# Patient Record
Sex: MALE | Race: BLACK OR AFRICAN AMERICAN
[De-identification: names, ages, dates, MRNs, and addresses within clinical notes are randomized per-mention and may not be internally consistent; named-entity substitution may affect disease eponyms.]

---

## 2020-08-29 ENCOUNTER — HOSPITAL ENCOUNTER (INPATIENT)
Dept: HOSPITAL 72 - EMR | Age: 45
LOS: 2 days | Discharge: HOME | DRG: 199 | End: 2020-08-31
Payer: COMMERCIAL

## 2020-08-29 VITALS — DIASTOLIC BLOOD PRESSURE: 100 MMHG | SYSTOLIC BLOOD PRESSURE: 162 MMHG

## 2020-08-29 VITALS — DIASTOLIC BLOOD PRESSURE: 116 MMHG | SYSTOLIC BLOOD PRESSURE: 182 MMHG

## 2020-08-29 VITALS — SYSTOLIC BLOOD PRESSURE: 207 MMHG | DIASTOLIC BLOOD PRESSURE: 113 MMHG

## 2020-08-29 VITALS — DIASTOLIC BLOOD PRESSURE: 130 MMHG | SYSTOLIC BLOOD PRESSURE: 193 MMHG

## 2020-08-29 VITALS — BODY MASS INDEX: 30.16 KG/M2 | HEIGHT: 74 IN | WEIGHT: 235 LBS

## 2020-08-29 VITALS — DIASTOLIC BLOOD PRESSURE: 102 MMHG | SYSTOLIC BLOOD PRESSURE: 192 MMHG

## 2020-08-29 VITALS — SYSTOLIC BLOOD PRESSURE: 175 MMHG | DIASTOLIC BLOOD PRESSURE: 110 MMHG

## 2020-08-29 VITALS — DIASTOLIC BLOOD PRESSURE: 120 MMHG | SYSTOLIC BLOOD PRESSURE: 185 MMHG

## 2020-08-29 VITALS — DIASTOLIC BLOOD PRESSURE: 117 MMHG | SYSTOLIC BLOOD PRESSURE: 195 MMHG

## 2020-08-29 VITALS — SYSTOLIC BLOOD PRESSURE: 201 MMHG | DIASTOLIC BLOOD PRESSURE: 115 MMHG

## 2020-08-29 DIAGNOSIS — E11.65: ICD-10-CM

## 2020-08-29 DIAGNOSIS — Z91.018: ICD-10-CM

## 2020-08-29 DIAGNOSIS — Z86.711: ICD-10-CM

## 2020-08-29 DIAGNOSIS — Z88.8: ICD-10-CM

## 2020-08-29 DIAGNOSIS — M51.26: ICD-10-CM

## 2020-08-29 DIAGNOSIS — Z79.4: ICD-10-CM

## 2020-08-29 DIAGNOSIS — Z91.041: ICD-10-CM

## 2020-08-29 DIAGNOSIS — I42.9: ICD-10-CM

## 2020-08-29 DIAGNOSIS — F19.10: ICD-10-CM

## 2020-08-29 DIAGNOSIS — G89.4: ICD-10-CM

## 2020-08-29 DIAGNOSIS — F43.10: ICD-10-CM

## 2020-08-29 DIAGNOSIS — F15.10: ICD-10-CM

## 2020-08-29 DIAGNOSIS — Z91.14: ICD-10-CM

## 2020-08-29 DIAGNOSIS — R19.7: ICD-10-CM

## 2020-08-29 DIAGNOSIS — D50.9: ICD-10-CM

## 2020-08-29 DIAGNOSIS — Z88.6: ICD-10-CM

## 2020-08-29 DIAGNOSIS — Z20.828: ICD-10-CM

## 2020-08-29 DIAGNOSIS — F41.8: ICD-10-CM

## 2020-08-29 DIAGNOSIS — M47.26: ICD-10-CM

## 2020-08-29 DIAGNOSIS — Z88.0: ICD-10-CM

## 2020-08-29 DIAGNOSIS — I16.0: Primary | ICD-10-CM

## 2020-08-29 DIAGNOSIS — R10.12: ICD-10-CM

## 2020-08-29 DIAGNOSIS — M48.061: ICD-10-CM

## 2020-08-29 DIAGNOSIS — E87.6: ICD-10-CM

## 2020-08-29 LAB
ADD MANUAL DIFF: NO
ALBUMIN SERPL-MCNC: 3.9 G/DL (ref 3.4–5)
ALBUMIN/GLOB SERPL: 0.9 {RATIO} (ref 1–2.7)
ALP SERPL-CCNC: 87 U/L (ref 46–116)
ALT SERPL-CCNC: 20 U/L (ref 12–78)
ANION GAP SERPL CALC-SCNC: 8 MMOL/L (ref 5–15)
APPEARANCE UR: CLEAR
APTT BLD: 24 SEC (ref 23–33)
APTT PPP: (no result) S
AST SERPL-CCNC: 17 U/L (ref 15–37)
BASOPHILS NFR BLD AUTO: 0.9 % (ref 0–2)
BILIRUB SERPL-MCNC: 0.3 MG/DL (ref 0.2–1)
BUN SERPL-MCNC: 13 MG/DL (ref 7–18)
CALCIUM SERPL-MCNC: 9.3 MG/DL (ref 8.5–10.1)
CHLORIDE SERPL-SCNC: 100 MMOL/L (ref 98–107)
CO2 SERPL-SCNC: 30 MMOL/L (ref 21–32)
CREAT SERPL-MCNC: 1.1 MG/DL (ref 0.55–1.3)
EOSINOPHIL NFR BLD AUTO: 2.4 % (ref 0–3)
ERYTHROCYTE [DISTWIDTH] IN BLOOD BY AUTOMATED COUNT: 15.9 % (ref 11.6–14.8)
FERRITIN SERPL-MCNC: 14 NG/ML (ref 8–388)
GLOBULIN SER-MCNC: 4.2 G/DL
GLUCOSE UR STRIP-MCNC: (no result) MG/DL
HCT VFR BLD CALC: 39.1 % (ref 42–52)
HGB BLD-MCNC: 11.9 G/DL (ref 14.2–18)
INR PPP: 0.9 (ref 0.9–1.1)
KETONES UR QL STRIP: NEGATIVE
LEUKOCYTE ESTERASE UR QL STRIP: NEGATIVE
LYMPHOCYTES NFR BLD AUTO: 17.9 % (ref 20–45)
MCV RBC AUTO: 86 FL (ref 80–99)
MONOCYTES NFR BLD AUTO: 8.7 % (ref 1–10)
NEUTROPHILS NFR BLD AUTO: 70.1 % (ref 45–75)
NITRITE UR QL STRIP: NEGATIVE
PH UR STRIP: 7 [PH] (ref 4.5–8)
PLATELET # BLD: 327 K/UL (ref 150–450)
POTASSIUM SERPL-SCNC: 3 MMOL/L (ref 3.5–5.1)
PROT UR QL STRIP: (no result)
RBC # BLD AUTO: 4.55 M/UL (ref 4.7–6.1)
SODIUM SERPL-SCNC: 138 MMOL/L (ref 136–145)
SP GR UR STRIP: 1.01 (ref 1–1.03)
UROBILINOGEN UR-MCNC: NORMAL MG/DL (ref 0–1)
WBC # BLD AUTO: 5.4 K/UL (ref 4.8–10.8)

## 2020-08-29 PROCEDURE — 87045 FECES CULTURE AEROBIC BACT: CPT

## 2020-08-29 PROCEDURE — 84550 ASSAY OF BLOOD/URIC ACID: CPT

## 2020-08-29 PROCEDURE — 86140 C-REACTIVE PROTEIN: CPT

## 2020-08-29 PROCEDURE — 80048 BASIC METABOLIC PNL TOTAL CA: CPT

## 2020-08-29 PROCEDURE — 85007 BL SMEAR W/DIFF WBC COUNT: CPT

## 2020-08-29 PROCEDURE — 96361 HYDRATE IV INFUSION ADD-ON: CPT

## 2020-08-29 PROCEDURE — 85730 THROMBOPLASTIN TIME PARTIAL: CPT

## 2020-08-29 PROCEDURE — 81003 URINALYSIS AUTO W/O SCOPE: CPT

## 2020-08-29 PROCEDURE — 74176 CT ABD & PELVIS W/O CONTRAST: CPT

## 2020-08-29 PROCEDURE — 83550 IRON BINDING TEST: CPT

## 2020-08-29 PROCEDURE — 36415 COLL VENOUS BLD VENIPUNCTURE: CPT

## 2020-08-29 PROCEDURE — 84100 ASSAY OF PHOSPHORUS: CPT

## 2020-08-29 PROCEDURE — 96375 TX/PRO/DX INJ NEW DRUG ADDON: CPT

## 2020-08-29 PROCEDURE — 85379 FIBRIN DEGRADATION QUANT: CPT

## 2020-08-29 PROCEDURE — 82746 ASSAY OF FOLIC ACID SERUM: CPT

## 2020-08-29 PROCEDURE — 71250 CT THORAX DX C-: CPT

## 2020-08-29 PROCEDURE — 82607 VITAMIN B-12: CPT

## 2020-08-29 PROCEDURE — 80076 HEPATIC FUNCTION PANEL: CPT

## 2020-08-29 PROCEDURE — 96374 THER/PROPH/DIAG INJ IV PUSH: CPT

## 2020-08-29 PROCEDURE — 84484 ASSAY OF TROPONIN QUANT: CPT

## 2020-08-29 PROCEDURE — 85025 COMPLETE CBC W/AUTO DIFF WBC: CPT

## 2020-08-29 PROCEDURE — 71045 X-RAY EXAM CHEST 1 VIEW: CPT

## 2020-08-29 PROCEDURE — 82962 GLUCOSE BLOOD TEST: CPT

## 2020-08-29 PROCEDURE — 99285 EMERGENCY DEPT VISIT HI MDM: CPT

## 2020-08-29 PROCEDURE — 85610 PROTHROMBIN TIME: CPT

## 2020-08-29 PROCEDURE — 93005 ELECTROCARDIOGRAM TRACING: CPT

## 2020-08-29 PROCEDURE — 96376 TX/PRO/DX INJ SAME DRUG ADON: CPT

## 2020-08-29 PROCEDURE — 83605 ASSAY OF LACTIC ACID: CPT

## 2020-08-29 PROCEDURE — 80307 DRUG TEST PRSMV CHEM ANLYZR: CPT

## 2020-08-29 PROCEDURE — 82728 ASSAY OF FERRITIN: CPT

## 2020-08-29 PROCEDURE — 93306 TTE W/DOPPLER COMPLETE: CPT

## 2020-08-29 PROCEDURE — 83735 ASSAY OF MAGNESIUM: CPT

## 2020-08-29 PROCEDURE — 80053 COMPREHEN METABOLIC PANEL: CPT

## 2020-08-29 PROCEDURE — 83540 ASSAY OF IRON: CPT

## 2020-08-29 RX ADMIN — INSULIN ASPART SCH UNITS: 100 INJECTION, SOLUTION INTRAVENOUS; SUBCUTANEOUS at 21:39

## 2020-08-29 RX ADMIN — INSULIN ASPART SCH UNITS: 100 INJECTION, SOLUTION INTRAVENOUS; SUBCUTANEOUS at 17:24

## 2020-08-29 RX ADMIN — LORAZEPAM PRN MG: 0.5 TABLET ORAL at 23:22

## 2020-08-29 RX ADMIN — CARVEDILOL SCH MG: 25 TABLET, FILM COATED ORAL at 21:38

## 2020-08-29 RX ADMIN — OXYCODONE HYDROCHLORIDE PRN MG: 15 TABLET ORAL at 23:22

## 2020-08-29 RX ADMIN — CLONIDINE HYDROCHLORIDE SCH MG: 0.2 TABLET ORAL at 17:27

## 2020-08-29 RX ADMIN — CLONIDINE HYDROCHLORIDE SCH MG: 0.2 TABLET ORAL at 13:20

## 2020-08-29 NOTE — EMERGENCY ROOM REPORT
History of Present Illness


General


Chief Complaint:  Abdominal Pain


Source:  Patient





Present Illness


HPI


The patient was signed out to me by Dr. Saldana.


The patient mainly is complaining about left upper abdominal pain that began 

yesterday.  He also has chest pain that is exertional radiating to his 

shoulder.  The abdominal pain radiates downward to his legs also.  Denies any 

diarrhea.  The patient received 1 dose of fentanyl.  Initially it helped but 

the pain is returned and he states is worse than when he came in.  He rates it 

at 6/10 and throbbing.  In addition he is complaining about a right-sided 

throbbing headache that somewhat less than that.  His blood pressures gone up 

in the emergency department he is worried about that also.  The patient denies 

dysuria or diarrhea.  The pain is throbbing and constant.


The patient states that he has had 2 pulmonary emboli in the last year.  He 

stopped anticoagulation in January or February.  He is requesting that we 

restart anticoagulation at this time.


Patient also states that he has a reduced ejection fraction.  We have a nuclear 

med scan scan from 2016 that showed 53%.  In discussing this the patient states 

that is much less than that at this time.


Patient is a diabetic.


Patient is allergic to contrast dye.  He states that he usually gets prednisone 

and Solu-Medrol before having his CT studies.





Patient is d-dimer is negative.  Lovenox not indicated or other anticoagulation 

at this time.  Suspicion for pulmonary embolus extremely low as oxygen 

saturation is normal.


Allergies:  


Coded Allergies:  


     HYDRALAZINE (Verified  Allergy, Severe, angio edema, 4/28/18)


 angio edema


     ETODOLAC (Unverified  Allergy, Mild, 4/28/18)


     AMLODIPINE (Unverified  Allergy, Unknown, 4/28/18)


 tolerates nifedipine 8/3/16


     ASPIRIN (Verified  Allergy, Unknown, 4/28/18)


     ATENOLOL (Verified  Allergy, Unknown, 4/28/18)


 tolerates metoprolol 8/3/16


     DIPHENHYDRAMINE (Verified  Allergy, Unknown, 4/28/18)


     HYDROCHLOROTHIAZIDE (Verified  Allergy, Unknown, 4/28/18)


     LABETALOL (Verified  Allergy, Unknown, 4/28/18)


 tolerates metoprolol 8/3/16


     LISINOPRIL (Unverified  Allergy, Unknown, 4/26/16)


     LORATADINE (Verified  Allergy, Unknown, 4/28/18)


     MILK (Verified  Allergy, Unknown, 4/28/18)


     MORPHINE (Verified  Allergy, Unknown, 4/28/18)


     NICARDIPINE (Verified  Allergy, Unknown, 4/28/18)


 tolerates nifedipine 8/3/16


     NITROGLYCERIN (Unverified  Allergy, Unknown, 4/28/18)


     PENICILLINS (Unverified  Allergy, Unknown, 4/28/18)


 tolerates cephalexin 8/3/16


     [place] Nut (Verified  Allergy, Unknown, 4/28/18)


     SHELLFISH DERIVED (Verified  Allergy, Unknown, 4/28/18)





COVID-19 Screening


Contact w/high risk pt:  No


Experienced COVID-19 symptoms?:  No


COVID-19 Testing performed PTA:  No





Patient History


Past Medical History:  see triage record


Social History:  Denies: smoking - recently stopped, alcohol use, drug use


Social History Narrative


Law Student


Reviewed Nursing Documentation:  PMH: Agreed; PSxH: Agreed





Nursing Documentation-PMH


Past Medical History:  No History, Except For


Hx Cardiac Problems:  Yes - loop recorder placed in 2019


Hx Hypertension:  Yes


Hx Diabetes:  Yes


Hx Cancer:  No


Hx Gastrointestinal Problems:  Yes - peptic ulcer


Hx Dialysis:  No - spinal stenosis


Hx Neurological Problems:  Yes - spinal stenosis





Review of Systems


All Other Systems:  negative except mentioned in HPI





Physical Exam





Vital Signs








  Date Time  Temp Pulse Resp B/P (MAP) Pulse Ox O2 Delivery O2 Flow Rate FiO2


 


8/29/20 04:22 99.0 80 16 138/90 (106) 98 Room Air  








Sp02 EP Interpretation:  reviewed, normal


General Appearance:  well appearing, no apparent distress, GCS 15, non-toxic


Head:  normocephalic


Eyes:  bilateral eye normal inspection, bilateral eye PERRL, bilateral eye EOMI


ENT:  moist mucus membranes


Neck:  supple


Respiratory:  normal inspection


Cardiovascular #1:  regular rate, rhythm, no edema


Cardiovascular #2:  2+ radial (R)


Gastrointestinal:  normal inspection, normal bowel sounds, no mass, non-

distended, no guarding, no rebound, tenderness - Left upper quadrant, overweight


Genitourinary:  no CVA tenderness


Musculoskeletal:  back normal, normal range of motion, no calf tenderness, gait/

station normal


Neurologic:  alert, oriented x3, grossly normal


Psychiatric:  anxious


Skin:  no rash, warm/dry





Procedures


Critical Care Time


Critical Care Time


Total Critical Care Time: 45 min bedside evaluation and treatment excludes 

procedures (EKG).


Reason for critical care: review of records, hypertensive urgency, discussion 

with HMO physician, admitting MD, GI specialist


Possible complications: hypotension, hypertension, MI, shock, arrhythmias, 

metabolic acidosis, end organ damage.


Interventions: analgesia, metoprolol, repeat evaluations, 


Course: Patient signed out to me with abdominal pain, chest pain and 

hypertension.  Expanded evaluation undertaken.  Treatment of pain.  Continued 

hypertension.  Multiple discussions with HMO MD.  Metoprolol given IV.  CT 

reviewed.  Repeat exam and analgesia.  HTN continued and metoprolol repeated 

IV.  HMO physician states unstable for transfer.  Discussion with admitting MD 

and GI consultant.


Consultations: nursing staff, HMO physician, admitting physician, GI specialist

, signing out ERMD


Performed by: Dr. Rey


Tolerated well condition = serious - deemed non-transferrable by O MD due to 

hypertensive urgency





Medical Decision Making


Diagnostic Impression:  


 Primary Impression:  


 Hypertensive urgency


 Additional Impressions:  


 Hypertension


 Qualified Codes:  I10 - Essential (primary) hypertension


 Chest pain


 Qualified Codes:  R07.9 - Chest pain, unspecified


 Hypokalemia


 Abdominal pain


 Qualified Codes:  R10.12 - Left upper quadrant pain


 COVID-19 ruled out by laboratory testing


ER Course


Patient is complex presenting with left-sided chest pain and left abdominal 

pain of a days duration.  Differential includes acute myocardial infarction, 

acute coronary syndrome, gastritis, diverticulitis among viral syndrome, 

pancreatitis amongst others.  Work-up has been initiated.  Patient is 

complaining about increased pain and also his blood pressure is increasing and 

out-of-control at this time.  Further labs and CT of the chest and abdomen are 

ordered.  We are unable to use contrast because of his history of allergy at 

this time.  Consideration of anticoagulation if d-dimer is positive.  The 

patient will be treated for pain at this time.  As the patient's oxygen 

saturation is 100% on room air embolus is less likely at this time.  Most 

likely this patient will need to be admitted for observation.





EKG reviewed.  Sinus tachycardia with ST inversions inferiorly no acute injury.





Brief discussion with Dr. Mccain who states he does not want to take the 

patient.  He feels the patient is only drug seeking.  If we need to transfer, 

then he wants me to tell the patient that he will not receive any pain medicine.





COVID-19 test negative.





Patient states his pain is up to an 8 after receiving Dilaudid 1 mg.





BP still elevated after treating pain.  Metoprolol ordered.





Dr. Garza states to admit here as BP not controlled.





Repeat metoprolol.





Discussed with admitting MD as well as GI specialist who evaluated the patient 

in the ED.





BP still high but improved.





Laboratory Tests








Test


  8/29/20


05:00 8/29/20


05:05 8/29/20


05:20 8/29/20


06:45


 


Magnesium Level


  1.8 MG/DL


(1.8-2.4) 


  


  


 


 


Ferritin


  14 NG/ML


(8-388) 


  


  


 


 


Troponin I


  0.000 ng/mL


(0.000-0.056) 


  


  


 


 


C-Reactive Protein,


Quantitative 0.7 mg/dL


(0.00-0.90) 


  


  


 


 


White Blood Count


  


  5.4 K/UL


(4.8-10.8) 


  


 


 


Red Blood Count


  


  4.55 M/UL


(4.70-6.10)  L 


  


 


 


Hemoglobin


  


  11.9 G/DL


(14.2-18.0)  L 


  


 


 


Hematocrit


  


  39.1 %


(42.0-52.0)  L 


  


 


 


Mean Corpuscular Volume  86 FL (80-99)    


 


Mean Corpuscular Hemoglobin


  


  26.2 PG


(27.0-31.0)  L 


  


 


 


Mean Corpuscular Hemoglobin


Concent 


  30.5 G/DL


(32.0-36.0)  L 


  


 


 


Red Cell Distribution Width


  


  15.9 %


(11.6-14.8)  H 


  


 


 


Platelet Count


  


  327 K/UL


(150-450) 


  


 


 


Mean Platelet Volume


  


  7.1 FL


(6.5-10.1) 


  


 


 


Neutrophils (%) (Auto)


  


  70.1 %


(45.0-75.0) 


  


 


 


Lymphocytes (%) (Auto)


  


  17.9 %


(20.0-45.0)  L 


  


 


 


Monocytes (%) (Auto)


  


  8.7 %


(1.0-10.0) 


  


 


 


Eosinophils (%) (Auto)


  


  2.4 %


(0.0-3.0) 


  


 


 


Basophils (%) (Auto)


  


  0.9 %


(0.0-2.0) 


  


 


 


Sodium Level


  


  138 MMOL/L


(136-145) 


  


 


 


Potassium Level


  


  3.0 MMOL/L


(3.5-5.1)  L 


  


 


 


Chloride Level


  


  100 MMOL/L


() 


  


 


 


Carbon Dioxide Level


  


  30 MMOL/L


(21-32) 


  


 


 


Anion Gap


  


  8 mmol/L


(5-15) 


  


 


 


Blood Urea Nitrogen


  


  13 mg/dL


(7-18) 


  


 


 


Creatinine


  


  1.1 MG/DL


(0.55-1.30) 


  


 


 


Estimated Glomerular


Filtration Rate 


  > 60 mL/min


(>60) 


  


 


 


Glucose Level


  


  290 MG/DL


()  H 


  


 


 


Lactic Acid Level


  


  1.00 mmol/L


(0.4-2.0) 


  


 


 


Calcium Level


  


  9.3 MG/DL


(8.5-10.1) 


  


 


 


Total Bilirubin


  


  0.3 MG/DL


(0.2-1.0) 


  


 


 


Aspartate Amino Transferase


(AST) 


  17 U/L (15-37)


  


  


 


 


Alanine Aminotransferase (ALT)


  


  20 U/L (12-78)


  


  


 


 


Alkaline Phosphatase


  


  87 U/L


() 


  


 


 


Total Protein


  


  8.1 G/DL


(6.4-8.2) 


  


 


 


Albumin


  


  3.9 G/DL


(3.4-5.0) 


  


 


 


Globulin  4.2 g/dL    


 


Albumin/Globulin Ratio


  


  0.9 (1.0-2.7)


L 


  


 


 


Prothrombin Time


  


  


  10.4 SEC


(9.30-11.50) 


 


 


Prothrombin Time INR   0.9 (0.9-1.1)   


 


Activated Partial


Thromboplast Time 


  


  24 SEC (23-33)


  


 


 


D-Dimer


  


  


  0.38 mg/L FEU


(0.00-0.49) 


 


 


Urine Color    Pale yellow  


 


Urine Appearance    Clear  


 


Urine pH    7 (4.5-8.0)  


 


Urine Specific Gravity


  


  


  


  1.010


(1.005-1.035)


 


Urine Protein


  


  


  


  3+ (NEGATIVE)


H


 


Urine Glucose (UA)


  


  


  


  4+ (NEGATIVE)


H


 


Urine Ketones


  


  


  


  Negative


(NEGATIVE)


 


Urine Blood


  


  


  


  1+ (NEGATIVE)


H


 


Urine Nitrite


  


  


  


  Negative


(NEGATIVE)


 


Urine Bilirubin


  


  


  


  Negative


(NEGATIVE)


 


Urine Urobilinogen


  


  


  


  Normal MG/DL


(0.0-1.0)


 


Urine Leukocyte Esterase


  


  


  


  Negative


(NEGATIVE)


 


Urine RBC


  


  


  


  0-2 /HPF (0 -


0)  H


 


Urine WBC


  


  


  


  0-2 /HPF (0 -


0)


 


Urine Squamous Epithelial


Cells 


  


  


  Occasional


/LPF


 


Urine Bacteria


  


  


  


  Occasional


/HPF (NONE)


 


Urine Opiates Screen


  


  


  


  Negative


(NEGATIVE)


 


Urine Barbiturates Screen


  


  


  


  Negative


(NEGATIVE)


 


Phencyclidine (PCP) Screen


  


  


  


  Negative


(NEGATIVE)


 


Urine Amphetamines Screen


  


  


  


  Negative


(NEGATIVE)


 


Urine Benzodiazepines Screen


  


  


  


  Positive


(NEGATIVE)  H


 


Urine Cocaine Screen


  


  


  


  Negative


(NEGATIVE)


 


Urine Marijuana (THC) Screen


  


  


  


  Negative


(NEGATIVE)








Microbiology








 Date/Time


Source Procedure


Growth Status


 


 


 8/29/20 07:35


Nasopharynx SARS-CoV-2 RdRp Gene Assay - Final Complete








EKG Diagnostic Results


Rate:  tachycardiac


Rhythm:  NSR


ST Segments:  no acute changes - ST inversions inferiorly





Rhythm Strip Diag. Results


EP Interpretation:  yes


Rhythm:  no PVC's, no ectopy, other - Sinus tachycardia 102





Chest X-Ray Diagnostic Results


Chest X-Ray Diagnostic Results :  


   Chest X-Ray Ordered:  Yes


   # of Views/Limited/Complete:  1 View


   Indication:  Chest Pain


   EP Interpretation:  Yes


   Interpretation:  no consolidation, no effusion, no pneumothorax, other - 

globular heart


   Impression:  Other


   Electronically Signed by:  Electronically signed by Rosalino Rey MD





CT/MRI/US Diagnostic Results


CT/MRI/US Diagnostic Results :  


   Imaging Test Ordered:  chest/abd/pelvis


   Impression


 


IMPRESSION:     


  Normal abdomen and pelvis CT.


 IMPRESSION:     


  No acute findings in the chest.





Last Vital Signs








  Date Time  Temp Pulse Resp B/P (MAP) Pulse Ox O2 Delivery O2 Flow Rate FiO2


 


8/29/20 17:27    175/110    


 


8/29/20 17:26  75      


 


8/29/20 16:00 97.7  20  100   


 


8/29/20 11:08      Room Air  








Status:  improved


Disposition:  PLACE IN OBSERVATION


Condition:  Serious


Referrals:  


[facility]











[facility]











[facility]


Patient Instructions:  Abdominal Pain, Adult











Rosalino Rey MD Aug 29, 2020 06:49

## 2020-08-29 NOTE — NUR
Nurse Note:



Pt brought in by ambulance 26 c/o LT side upper and lower abd pain since 8/28. 
10/10 pain, Pt stated unk cause, denies trauma, denies abdnormal ingestion. Pt 
denies ETOH use, drug use. Pt stated he is healthy. Pt attached to [facility]. All 
safety measures met; will continue to [facility].

## 2020-08-29 NOTE — NUR
ED Nurse Note:



Recieved erport from pm nurse to resume care, pt ambulating from bathroom, 
states had large loose stool, recently medicated for pain, meds effective with 
pain level decreased to 5/10, pt replaced on monitoring, has patent saline lock 
in left hand, will continue to closely monitor while waiting for imaging.

## 2020-08-29 NOTE — DIAGNOSTIC IMAGING REPORT
EXAM:

  XR Chest, 1 View

 

CLINICAL HISTORY:

  VOMITING

 

TECHNIQUE:

  Frontal view of the chest.

 

COMPARISON:

  No relevant prior studies available.

 

FINDINGS:

  Lungs:  Unremarkable.  No consolidation.

  Pleural space:  Unremarkable.  No pneumothorax.

  Heart:  Unremarkable.  No cardiomegaly.

  Mediastinum:  Unremarkable.

  Bones/joints:  Unremarkable.

 

IMPRESSION:     

  Normal chest x-ray.

## 2020-08-29 NOTE — EMERGENCY ROOM REPORT
History of Present Illness


General


Chief Complaint:  Abdominal Pain


Source:  Patient





Present Illness


HPI


45-year-old male with one episode of vomiting and diarrhea earlier tonight and 

generalized abdominal pain.  Patient says that he was eating dinner and then 

several hours later began to feel generalized abdominal cramping and pain and 

vomited once and had one episode of diarrhea.  Vomiting was nonbilious nonbloody

, only occurred 1 time.  He had one episode of diarrhea nonbloody.  Pain is 

sharp in nature, located diffusely, poorly localized.  He is in no distress 

whatsoever in the emergency department.  Denies fevers, chills, chest pain, 

palpitation, shortness of breath, back pain, dysuria, hematuria.  Patient 

claims that he has CHF with an ejection fraction of 16% and 2 pulmonary 

embolisms in the past but not on any anticoagulation because "they said I did 

not need it anymore."


On arrival the patient was immediately demanding Dilaudid and became very 

agitated when I told him that there was no indication for this.


Allergies:  


Coded Allergies:  


     HYDRALAZINE (Verified  Allergy, Severe, angio edema, 4/28/18)


 angio edema


     ETODOLAC (Unverified  Allergy, Mild, 4/28/18)


     AMLODIPINE (Unverified  Allergy, Unknown, 4/28/18)


 tolerates nifedipine 8/3/16


     ASPIRIN (Verified  Allergy, Unknown, 4/28/18)


     ATENOLOL (Verified  Allergy, Unknown, 4/28/18)


 tolerates metoprolol 8/3/16


     DIPHENHYDRAMINE (Verified  Allergy, Unknown, 4/28/18)


     HYDROCHLOROTHIAZIDE (Verified  Allergy, Unknown, 4/28/18)


     LABETALOL (Verified  Allergy, Unknown, 4/28/18)


 tolerates metoprolol 8/3/16


     LISINOPRIL (Unverified  Allergy, Unknown, 4/26/16)


     LORATADINE (Verified  Allergy, Unknown, 4/28/18)


     MILK (Verified  Allergy, Unknown, 4/28/18)


     MORPHINE (Verified  Allergy, Unknown, 4/28/18)


     NICARDIPINE (Verified  Allergy, Unknown, 4/28/18)


 tolerates nifedipine 8/3/16


     NITROGLYCERIN (Unverified  Allergy, Unknown, 4/28/18)


     PENICILLINS (Unverified  Allergy, Unknown, 4/28/18)


 tolerates cephalexin 8/3/16


     [place] Nut (Verified  Allergy, Unknown, 4/28/18)


     SHELLFISH DERIVED (Verified  Allergy, Unknown, 4/28/18)





COVID-19 Screening


Contact w/high risk pt:  No


Experienced COVID-19 symptoms?:  No


COVID-19 Testing performed PTA:  No





Nursing Documentation-[facility]


Past Medical History:  No History, Except For


Hx Cardiac Problems:  Yes - loop recorder placed in 2019


Hx Hypertension:  Yes


Hx Diabetes:  Yes


Hx Cancer:  No


Hx Gastrointestinal Problems:  Yes - peptic ulcer


Hx Dialysis:  No - spinal stenosis


Hx Neurological Problems:  Yes - spinal stenosis





Review of Systems


All Other Systems:  negative except mentioned in HPI





Physical Exam





Vital Signs








  Date Time  Temp Pulse Resp B/P (MAP) Pulse Ox O2 Delivery O2 Flow Rate FiO2


 


8/29/20 04:22 99.0 80 16 138/90 (106) 98 Room Air  








Sp02 EP Interpretation:  reviewed, normal


General Appearance:  no apparent distress, alert, GCS 15, non-toxic


Head:  normocephalic, atraumatic


Eyes:  bilateral eye normal inspection, bilateral eye PERRL


ENT:  hearing grossly normal, normal pharynx, no angioedema, normal voice


Neck:  full range of motion, supple/symm/no masses


Respiratory:  chest non-tender, lungs clear, normal breath sounds, speaking 

full sentences


Cardiovascular #1:  regular rate, rhythm, no edema


Cardiovascular #2:  2+ carotid (R), 2+ carotid (L), 2+ radial (R), 2+ radial (L)

, 2+ dorsalis pedis (R), 2+ dorsalis pedis (L)


Gastrointestinal:  normal bowel sounds, soft, non-distended, no guarding, no 

rebound, other - Subjective generalized abdominal tenderness.  Nondistended, non

-peritoneal ache.  No focal pain.  No rebound or guarding


Rectal:  deferred


Genitourinary:  normal inspection, no CVA tenderness


Musculoskeletal:  back normal, normal range of motion, calf tenderness, gait/

station normal, non-tender


Neurologic:  alert, motor strength/tone normal, oriented x3, sensory intact, 

responsive, speech normal


Psychiatric:  judgement/insight normal, memory normal, mood/affect normal, no 

suicidal/homicidal ideation


Lymphatic:  no adenopathy





Medical Decision Making


Diagnostic Impression:  


 Primary Impression:  


 Vomiting and diarrhea


ER Course


EKG: Rate 104 bpm.  QTc 497.  No obvious ST or T wave abnormalities.  Otherwise 

normal intervals.  Sinus tachycardia.  Normal axis





Chest x-ray: No infiltrate/effusion. Mediastinum within normal limits





Laboratory Tests








Test


  8/29/20


05:05


 


White Blood Count


  5.4 K/UL


(4.8-10.8)


 


Red Blood Count


  4.55 M/UL


(4.70-6.10)  L


 


Hemoglobin


  11.9 G/DL


(14.2-18.0)  L


 


Hematocrit


  39.1 %


(42.0-52.0)  L


 


Mean Corpuscular Volume 86 FL (80-99)  


 


Mean Corpuscular Hemoglobin


  26.2 PG


(27.0-31.0)  L


 


Mean Corpuscular Hemoglobin


Concent 30.5 G/DL


(32.0-36.0)  L


 


Red Cell Distribution Width


  15.9 %


(11.6-14.8)  H


 


Platelet Count


  327 K/UL


(150-450)


 


Mean Platelet Volume


  7.1 FL


(6.5-10.1)


 


Neutrophils (%) (Auto)


  70.1 %


(45.0-75.0)


 


Lymphocytes (%) (Auto)


  17.9 %


(20.0-45.0)  L


 


Monocytes (%) (Auto)


  8.7 %


(1.0-10.0)


 


Eosinophils (%) (Auto)


  2.4 %


(0.0-3.0)


 


Basophils (%) (Auto)


  0.9 %


(0.0-2.0)


 


Sodium Level


  138 MMOL/L


(136-145)


 


Potassium Level


  3.0 MMOL/L


(3.5-5.1)  L


 


Chloride Level


  100 MMOL/L


()


 


Carbon Dioxide Level


  30 MMOL/L


(21-32)


 


Anion Gap


  8 mmol/L


(5-15)


 


Blood Urea Nitrogen


  13 mg/dL


(7-18)


 


Creatinine


  1.1 MG/DL


(0.55-1.30)


 


Estimated Glomerular


Filtration Rate > 60 mL/min


(>60)


 


Glucose Level


  290 MG/DL


()  H


 


Lactic Acid Level


  1.00 mmol/L


(0.4-2.0)


 


Calcium Level


  9.3 MG/DL


(8.5-10.1)


 


Total Bilirubin


  0.3 MG/DL


(0.2-1.0)


 


Aspartate Amino Transferase


(AST) 17 U/L (15-37)


 


 


Alanine Aminotransferase (ALT)


  20 U/L (12-78)


 


 


Alkaline Phosphatase


  87 U/L


()


 


Total Protein


  8.1 G/DL


(6.4-8.2)


 


Albumin


  3.9 G/DL


(3.4-5.0)


 


Globulin 4.2 g/dL  


 


Albumin/Globulin Ratio


  0.9 (1.0-2.7)


L





45-year-old male here with vomiting and diarrhea.  Patient was hemodynamically 

stable and in no acute distress whatsoever.  Despite this the patient was 

requesting Dilaudid multiple times in the emergency department.  Review of the 

patient's [employer] activity showed many recent prescriptions for opiate 

medications.  I reviewed this with the patient and he immediately became very 

agitated and then started demanding fentanyl.  He received 1 dose of fentanyl 

in the emergency department and was tolerating p.o. intake and he appeared to 

be in no acute distress.  CBC unremarkable.  CMP revealed a mildly low 

potassium of 3.0 which was repleted with 40 mg p.o. that the patient tolerated 

well.  He received Zofran.  When informed the patient that his labs were 

otherwise unremarkable he became extremely agitated and became verbally abusive 

and angry.  He otherwise was in no acute distress whatsoever and had completely 

normal vital signs in the emergency department.  Discharged in stable condition.





Last Vital Signs








  Date Time  Temp Pulse Resp B/P (MAP) Pulse Ox O2 Delivery O2 Flow Rate FiO2


 


8/29/20 04:25  102 16   Room Air  


 


8/29/20 04:25 98.7   162/100 99   








Referrals:  


NON PHYSICIAN (PCP)











Win Saldana M.D. Aug 29, 2020 05:41

## 2020-08-29 NOTE — HISTORY AND PHYSICAL REPORT
DATE OF ADMISSION:  08/29/2020

TIME SEEN:  At 9 a.m.



CONSULTANTS:

1. Phill Fleming MD.

2. Behnoush Zarrini, MD.

3. Kirsten Aguilar MD.

4. Rishabh Sweet MD.



CHIEF COMPLAINT:  Abdominal pain.



BRIEF HISTORY:  The patient is a 45-year-old male, who lives at home,

presents with increased abdominal pain for nine days with nausea,

vomiting, and diarrhea as well, came into [facility], diagnosed with the

above, and hypokalemia and was being admitted to medical floor.

Currently, slightly anxious in bed, oriented x3, in slight distress

secondary to abdominal pain.



PAST MEDICAL HISTORY:  Includes hypertension, diabetes, anxiety, PTSD.



PAST SURGICAL HISTORY:  Hernia x2, pneumothorax, back surgery.



MEDICATIONS:  Include Dilaudid, Lopressor, K-Dur, Zofran.



ALLERGIES:  Sulfa, amlodipine, aspirin, atenolol, diphenhydramine, _____,

hydralazine, hydrochlorothiazide, labetalol, lisinopril, loratadine and

morphine.



SOCIAL HISTORY:  The patient states no smoking, no alcohol, no intravenous

drug abuse.



FAMILY HISTORY:  Noncontributory.



PHYSICAL EXAMINATION:

GENERAL:  Slightly anxious in ER [facility], oriented x3, in slight distress

secondary to abdominal pain.

VITAL SIGNS:  Temperature is 98, pulse 89, respirations 13, blood pressure

_____.

CARDIOVASCULAR:  No murmur.

LUNGS:  Distant and clear.

ABDOMEN:  Bowel sounds positive.  Soft.  Slightly tender.  No guarding.  No

rigidity.  No rebound.

EXTREMITIES:  Show no cyanosis, clubbing, or edema.

NEUROLOGIC:  The patient moves all extremities, slightly weak.



LABORATORY AND DIAGNOSTIC DATA:  Labs at this time show hemoglobin and

hematocrit 11/39, otherwise CBC is normal.  BMP shows potassium 3.0,

glucose 290, otherwise normal.  INR is 0.9.  PTT 24.  Urinalysis show 1+

blood, 3+ protein, 4+ glucose.  Urine tox positive for benzos.



ASSESSMENT:

1. Abdominal pain.

2. Hypertensive urgency.

3. Anemia.

4. Hypokalemia.

5. Diabetes.

6. Hyperglycemia.

7. PTSD.

8. Anxiety.



PLAN:

1. NPO.

2. IV fluids.

3. Blood pressure, blood sugar, and pain control.

4. Resume home medications.

5. We will add Cardiology and Nephrology evaluation.

6. We will continue to follow this patient medically.

7. PT and dietary evaluation.

8. CBC and BMP in the morning.









  ______________________________________________

  Madi Torres D.O.





DR:  JHONNY

D:  08/29/2020 09:15

T:  08/30/2020 00:10

JOB#:  [id number]

CC:

## 2020-08-29 NOTE — NUR
Nurse Note:



Per Dr. Cabrales, pt is being admitted for further observation. Called lab for 
add ons for blood test. Urine sent to lab, awaiting result. Report given SARAH Kauffman for contintuiy of care. Pt ambulated to restroom with steady gait. Pt stated 
he might have an episode of diarrhea. Pending CT; radiology tech aware.

## 2020-08-29 NOTE — NUR
NURSE HAND-OFF REPORT: 



Important Events on Shift:Pain Management

Patient Status: Stable

Diet: Cardiac



Pending Orders: NA

Pending Results/Labs:OB stool

Pending MD notification:NA



Latest Vital Signs: Temperature 97.7 , Pulse 75 , B/P 175 /110 , Respiratory Rate 20 , O2 
 , Room Air, O2 Flow Rate .  

Vital Sign Comment: Stable



EKG Rhythm: Sinus Rhythm

Rhythm change?: N 

MD Notified?: -

MD Response: 



Latest Fong Fall Score: 20  

Fall Risk: Low Risk 

Safety Measures: Call light Within Reach, Bed Alarm Zone 1, Side Rails Side Rails x2, Bed 
position Low and Locked.

Fall Precautions: 

Patient Fall Education



Report given to Sharon/SARAH.

## 2020-08-29 NOTE — NUR
NURSE NOTES:

Patient is awake, alert and oriented x4, complaining of back and left upper abdominal pain . 
IV on Right hand 22g, intact. Encouraged to use call light when needed. Contacting primary 
MD and consults for medication especially pain medication orders. Called nurse supervisor as 
day nurse stated she already tried to call and could not reach Dr Fabian. will attempt 
again

## 2020-08-29 NOTE — CONSULTATION
DATE OF CONSULTATION:  08/29/2020

GASTROENTEROLOGY CONSULTATION



CONSULTING PHYSICIAN:  Angi Allen MD



REFERRING PHYSICIAN:  Madi Torres DO



CHIEF COMPLAINT:  I was asked to see this patient by Dr. Madi Torres for

evaluation of abdominal pain.



HISTORY OF PRESENT ILLNESS:  The patient is a 45-year-old 

man with multiple medical problems including diabetes, who comes in to the

hospital due to 1-day history of abdominal pain.  He says the pain is more

on the left side, perhaps more on the left upper side.  He had 3 episodes

of vomiting yesterday and diarrhea.  He has been on omeprazole for

long-term for acid control.  He has not had endoscopy or colonoscopy

before.  He is on long-term pain medications for his back pain.



PAST MEDICAL HISTORY:  History of diabetes, hypertension, anxiety, and

degenerative joint disease with spinal stenosis.



MEDICATIONS:  Insulin, nitroglycerin, atenolol, morphine, lisinopril,

Valium, clonidine, carvedilol, and Dilaudid.



FAMILY HISTORY:  Noncontributory.



SOCIAL HISTORY:  The patient is single.  He does not smoke or drink alcohol

at this time.



REVIEW OF SYSTEMS:  Otherwise negative.



PHYSICAL EXAMINATION:

GENERAL:  A well-developed, well-nourished  man seen in his

room in the emergency room.

HEENT:  Normocephalic and atraumatic.  Sclerae anicteric.  Oropharynx

clear.

NECK:  Supple.

CHEST:  Clear to auscultation.

CARDIAC:  Revealed regular rate.

ABDOMEN:  Soft and mildly tender in the left side without guarding or

rebound.

EXTREMITIES:  Revealed no edema.



LABORATORY DATA:  Noted.



ASSESSMENT:  This patient presents with abdominal pain, nausea, vomiting,

and diarrhea, but all of which have been going on for 24 hours in

duration.  Differential diagnoses would most notably include viral

gastroenteritis, which should be self-limiting.  Other pathologies can be

considered only if the patient's symptoms persist.  In the meantime, his

stools can be checked for bacterial pathogens such as Salmonella or

Clostridium difficile.  His oral diet can be continued for now.



RECOMMENDATIONS:

1. Check stool cultures.

2. IV fluids.

3. Oral diet as tolerated.

4. Follow laboratory parameters and exam.



Thank you for asking me to participate in the care of this patient.









  ______________________________________________

  Angi Allen M.D.





DR:  Zhao

D:  08/29/2020 14:06

T:  08/29/2020 18:19

JOB#:  [id number]

CC:

## 2020-08-29 NOTE — NUR
NURSE NOTES:

Patient transferred from ED via gurney to room 219-2. Patient awake, alert and oriented x4, 
able to make needs known, complaining of left upper abdominal pain at this time. Cardiac 
monitor placed and reading sinus rhythm. Belonging list check done with transferring nurse. 
Patient has two cell phones with chargers. IV on Right hand 22g, intact and clean. Vitals 
taken. BP running high at this time, will follow up with admitting doctor. orient to room, 
restroom and call light. Fall precaution measure done. encouraged to use call light when 
needed. Will contact Primary MD for admission orders.

## 2020-08-29 NOTE — NUR
ED Nurse Note:



Pt re-medicated for pain, meds given effective with pain level at 4/10, pt also 
tolerating ice chips without complications, no diarrhea noted or emesis, pt b/p 
remains elevated, MD informed, med orders given again, also pt is to be 
admitted here, will prepare for admission and monitor b/p.

## 2020-08-29 NOTE — DIAGNOSTIC IMAGING REPORT
EXAM:

  CT Abdomen and Pelvis Without Intravenous Contrast

 

CLINICAL HISTORY:

  ABD PAIN

 

TECHNIQUE:

  Axial computed tomography images of the abdomen and pelvis without 

intravenous contrast.  CTDI is 10 mGy and DLP is 772 be mGy-cm.  One or 

more of the following dose reduction techniques were used: automated 

exposure control, adjustment of the mA and/or kV according to patient 

size, use of iterative reconstruction technique.

 

COMPARISON:

  No relevant prior studies available.

 

FINDINGS:

  Lung bases:  Unremarkable.  No mass.  No consolidation.

 

 ABDOMEN:

  Liver:  Unremarkable.

  Gallbladder and bile ducts:  Unremarkable.  No calcified stones.  No 

ductal dilation.

  Pancreas:  Unremarkable.  No ductal dilation.

  Spleen:  Unremarkable.  No splenomegaly.

  Adrenals:  Unremarkable.  No mass.

  Kidneys and ureters:  Unremarkable.  No obstructing stones.  No 

hydronephrosis.

  Stomach and bowel:  Unremarkable.  No obstruction.  No mucosal 

thickening.

 

 PELVIS:

  Appendix:  No findings to suggest acute appendicitis.

  Bladder:  Unremarkable.  No stones.

  Reproductive:  Unremarkable as visualized.

 

 ABDOMEN and PELVIS:

  Intraperitoneal space:  Unremarkable.  No free air.  No significant 

fluid collection.

  Bones/joints:  No acute fracture.  No dislocation.

  Soft tissues:  Unremarkable.

  Vasculature:  Unremarkable.  No abdominal aortic aneurysm.

  Lymph nodes:  Unremarkable.  No enlarged lymph nodes.

 

IMPRESSION:     

  Normal abdomen and pelvis CT.

 

_______________________________________________

 

EXAM:

  CT Chest Without Intravenous Contrast

 

CLINICAL HISTORY:

  ABD PAIN

 

TECHNIQUE:

  Axial computed tomography images of the chest without intravenous 

contrast.  CTDI is 10 mGy and DLP is 772 mGy-cm.  One or more of the 

following dose reduction techniques were used: automated exposure control,

 adjustment of the mA and/or kV according to patient size, use of 

iterative reconstruction technique.

 

COMPARISON:

  No relevant prior studies available.

 

FINDINGS:

  Lungs:  Unremarkable.  No mass.  No consolidation.

  Pleural space:  Unremarkable.  No pneumothorax.  No significant 

effusion.

  Heart:  Mild cardiomegaly.  No significant pericardial effusion.

  Bones/joints:  Unremarkable.  No acute fracture.  No dislocation.

  Soft tissues:  Unremarkable.

  Vasculature:  Unremarkable.  No thoracic aortic aneurysm.

  Lymph nodes:  Unremarkable.  No enlarged lymph nodes.

 

IMPRESSION:     

  No acute findings in the chest.

## 2020-08-29 NOTE — CONSULTATION
DATE OF CONSULTATION:  08/29/2020

CARDIOLOGY CONSULTATION



CONSULTING PHYSICIAN:  Rishabh Sweet MD



REFERRING PHYSICIAN:  Madi Torres MD



REASON FOR CONSULTATION:  Management of hypertension and epigastric pain.



HISTORY OF PRESENT ILLNESS:  The patient is a 45-year-old 

gentleman with history of hypertension and history of noncompliance,

presented to the emergency room with left upper abdominal pain and chest

pain with radiation to his shoulder.  The patient's abdominal pain,

however, radiates to his legs.  The patient received one dose of fentanyl

_____ but the pain has returned.  The patient also had headache and blood

pressure _____ to the emergency room.  The patient has _____ history of

pulmonary emboli in the past _____ anticoagulation in January of this year

and is requesting to be started on anticoagulation.  He says he has a

reduced ejection fraction, but his nuclear stress test in 2006 showed EF

of 53%.



REVIEW OF SYSTEMS:  Negative other than what was mentioned in history of

present illness.



PAST MEDICAL HISTORY:  As mentioned above.



FAMILY HISTORY:  Noncontributory.



ALLERGIES:  He has multiple drug allergies including hydralazine,

amlodipine, aspirin, atenolol, labetalol, hydrochlorothiazide, lisinopril,

morphine, nicardipine, nitroglycerin, penicillin, peanuts, and

shellfish.



PHYSICAL EXAMINATION:

VITAL SIGNS:  Blood pressure of 194/117, pulse is 89, respirations 18, and

he is afebrile.

HEAD AND NECK:  Showed no JVD.

LUNGS:  Clear.

CARDIOVASCULAR:  Shows regular S1 and S2 with no gallop or murmur.

 

ABDOMEN:  Soft.

EXTREMITIES:  No pitting edema.



LABORATORY AND DIAGNOSTIC DATA:  Labs show white count of 5.4, hemoglobin

11.9, hematocrit of 39, and platelet count is 327.  Sodium 138, potassium

3.0, BUN of 13, creatinine of 1.  Troponin negative x2.  Urine toxicology

is positive for benzodiazepine. In April 2019, it was positive for

amphetamine.



ASSESSMENT AND PLAN:

1. Accelerated hypertension.  Increase the Coreg to 25 mg b.i.d.  The

patient is also on Procardia 60 mg b.i.d. and clonidine 0.2 mg b.i.d. that

would be continued.  I will add p.r.n. clonidine to his medical regimen.

2. Chest pain.  Troponins are negative.  We will get a repeat EKG and

echocardiogram for further evaluation.

3. History of pulmonary embolus.  The patient underwent a CT of the

chest, abdomen, and pelvis that showed no acute findings, and a CT of

abdomen and pelvis was also normal.

4. History of noncompliance.

5. History of depression and anxiety.



Thank you very much for allowing me to participate in the care of this

patient.  Please do not hesitate to contact me for any questions regarding

my evaluation.



Sincerely,









  ______________________________________________

  Rishabh Sweet M.D.





DR:  Corazon

D:  08/29/2020 14:40

T:  08/29/2020 23:39

JOB#:  [id number]

CC:

## 2020-08-29 NOTE — NUR
ED Nurse Note:



Pt completed all ordered test, pt is to be admitted to hospital, waiting for 
info of to transfer or admit here, pt remains awake and alert, conversing with 
family on phone, pt states pain is returning at 8/10 and asking for more 
dilaudid, MD informed, will re-medicate and prepae for admission, pt b/p is 
slightly elevated also, MD informed and b/p meds ordered, will monitor for 
effectiveness.

## 2020-08-29 NOTE — NUR
ED Nurse Note:



Pt has room for admission, report called to floor nurse, pt remains in bed 
awake and alert, belongings list completed and with pt, pt being taken to unit 
via gurney with RN and er-tech, nad noted during pt transport to floor. pt b/p 
remains elevated and MD is aware, states admitting md aware and will handle.

## 2020-08-29 NOTE — NUR
Nurse Note:



Pt refusing to leave after providing information for discharge. Pt stated "I 
got no help, my pain is not treated, and I did not get the treatment needed for 
my illness". Pt stated "I got Fentanyl but my pain is still here. I am not 
ready to leave." Pt is medically cleared by ER MD; all information was 
explained and advised to follow up with primary care physican for further 
evulation.

## 2020-08-29 NOTE — CARDIAC ELECTROPHYSIOLOGY PN
Subjective


Subjective


[id number]





Objective





Last 24 Hour Vital Signs








  Date Time  Temp Pulse Resp B/P (MAP) Pulse Ox O2 Delivery O2 Flow Rate FiO2


 


8/29/20 13:20    195/117    


 


8/29/20 12:00  89      


 


8/29/20 11:08 96.3 75 18 195/117 (143) 97   


 


8/29/20 11:08      Room Air  


 


8/29/20 10:20 98.3 83 16 193/130 100 Room Air  


 


8/29/20 10:00 98.3 83 16 185/120 100 Room Air  


 


8/29/20 09:35  83  193/130    


 


8/29/20 09:30 98.3 83 16 193/130 100 Room Air  


 


8/29/20 09:24 98.3       


 


8/29/20 08:54  89  201/115    


 


8/29/20 08:30 98.3 89 13 201/115 100 Room Air  


 


8/29/20 08:19 97.4 95 13 207/113 100 Room Air  


 


8/29/20 07:31 98.7       


 


8/29/20 07:05 98.7 98 16 192/102 99 Room Air  


 


8/29/20 06:02 98.7       


 


8/29/20 04:25  102 16   Room Air  


 


8/29/20 04:25 98.7 102 16 162/100 99 Room Air  


 


8/29/20 04:22 99.0 80 16 138/90 (106) 98 Room Air  

















Intake and Output  


 


 8/28/20 8/29/20





 19:00 07:00


 


Intake Total  1000 ml


 


Balance  1000 ml


 


  


 


IV Total  1000 ml











Laboratory Tests








Test


  8/29/20


05:00 8/29/20


05:05 8/29/20


05:20 8/29/20


06:45


 


Magnesium Level


  1.8 MG/DL


(1.8-2.4) 


  


  


 


 


Ferritin


  14 NG/ML


(8-388) 


  


  


 


 


Troponin I


  0.000 ng/mL


(0.000-0.056) 


  


  


 


 


C-Reactive Protein,


Quantitative 0.7 mg/dL


(0.00-0.90) 


  


  


 


 


White Blood Count


  


  5.4 K/UL


(4.8-10.8) 


  


 


 


Red Blood Count


  


  4.55 M/UL


(4.70-6.10)  L 


  


 


 


Hemoglobin


  


  11.9 G/DL


(14.2-18.0)  L 


  


 


 


Hematocrit


  


  39.1 %


(42.0-52.0)  L 


  


 


 


Mean Corpuscular Volume  86 FL (80-99)    


 


Mean Corpuscular Hemoglobin


  


  26.2 PG


(27.0-31.0)  L 


  


 


 


Mean Corpuscular Hemoglobin


Concent 


  30.5 G/DL


(32.0-36.0)  L 


  


 


 


Red Cell Distribution Width


  


  15.9 %


(11.6-14.8)  H 


  


 


 


Platelet Count


  


  327 K/UL


(150-450) 


  


 


 


Mean Platelet Volume


  


  7.1 FL


(6.5-10.1) 


  


 


 


Neutrophils (%) (Auto)


  


  70.1 %


(45.0-75.0) 


  


 


 


Lymphocytes (%) (Auto)


  


  17.9 %


(20.0-45.0)  L 


  


 


 


Monocytes (%) (Auto)


  


  8.7 %


(1.0-10.0) 


  


 


 


Eosinophils (%) (Auto)


  


  2.4 %


(0.0-3.0) 


  


 


 


Basophils (%) (Auto)


  


  0.9 %


(0.0-2.0) 


  


 


 


Sodium Level


  


  138 MMOL/L


(136-145) 


  


 


 


Potassium Level


  


  3.0 MMOL/L


(3.5-5.1)  L 


  


 


 


Chloride Level


  


  100 MMOL/L


() 


  


 


 


Carbon Dioxide Level


  


  30 MMOL/L


(21-32) 


  


 


 


Anion Gap


  


  8 mmol/L


(5-15) 


  


 


 


Blood Urea Nitrogen


  


  13 mg/dL


(7-18) 


  


 


 


Creatinine


  


  1.1 MG/DL


(0.55-1.30) 


  


 


 


Estimat Glomerular Filtration


Rate 


  > 60 mL/min


(>60) 


  


 


 


Glucose Level


  


  290 MG/DL


()  H 


  


 


 


Lactic Acid Level


  


  1.00 mmol/L


(0.4-2.0) 


  


 


 


Calcium Level


  


  9.3 MG/DL


(8.5-10.1) 


  


 


 


Total Bilirubin


  


  0.3 MG/DL


(0.2-1.0) 


  


 


 


Aspartate Amino Transf


(AST/SGOT) 


  17 U/L (15-37)


  


  


 


 


Alanine Aminotransferase


(ALT/SGPT) 


  20 U/L (12-78)


  


  


 


 


Alkaline Phosphatase


  


  87 U/L


() 


  


 


 


Total Protein


  


  8.1 G/DL


(6.4-8.2) 


  


 


 


Albumin


  


  3.9 G/DL


(3.4-5.0) 


  


 


 


Globulin  4.2 g/dL    


 


Albumin/Globulin Ratio


  


  0.9 (1.0-2.7)


L 


  


 


 


Prothrombin Time


  


  


  10.4 SEC


(9.30-11.50) 


 


 


Prothromb Time International


Ratio 


  


  0.9 (0.9-1.1)  


  


 


 


Activated Partial


Thromboplast Time 


  


  24 SEC (23-33)


  


 


 


D-Dimer


  


  


  0.38 mg/L FEU


(0.00-0.49) 


 


 


Urine Color    Pale yellow  


 


Urine Appearance    Clear  


 


Urine pH    7 (4.5-8.0)  


 


Urine Specific Gravity


  


  


  


  1.010


(1.005-1.035)


 


Urine Protein


  


  


  


  3+ (NEGATIVE)


H


 


Urine Glucose (UA)


  


  


  


  4+ (NEGATIVE)


H


 


Urine Ketones


  


  


  


  Negative


(NEGATIVE)


 


Urine Blood


  


  


  


  1+ (NEGATIVE)


H


 


Urine Nitrite


  


  


  


  Negative


(NEGATIVE)


 


Urine Bilirubin


  


  


  


  Negative


(NEGATIVE)


 


Urine Urobilinogen


  


  


  


  Normal MG/DL


(0.0-1.0)


 


Urine Leukocyte Esterase


  


  


  


  Negative


(NEGATIVE)


 


Urine RBC


  


  


  


  0-2 /HPF (0 -


0)  H


 


Urine WBC


  


  


  


  0-2 /HPF (0 -


0)


 


Urine Squamous Epithelial


Cells 


  


  


  Occasional


/LPF


 


Urine Bacteria


  


  


  


  Occasional


/HPF (NONE)


 


Urine Opiates Screen


  


  


  


  Negative


(NEGATIVE)


 


Urine Barbiturates Screen


  


  


  


  Negative


(NEGATIVE)


 


Phencyclidine (PCP) Screen


  


  


  


  Negative


(NEGATIVE)


 


Urine Amphetamines Screen


  


  


  


  Negative


(NEGATIVE)


 


Urine Benzodiazepines Screen


  


  


  


  Positive


(NEGATIVE)  H


 


Urine Cocaine Screen


  


  


  


  Negative


(NEGATIVE)


 


Urine Marijuana (THC) Screen


  


  


  


  Negative


(NEGATIVE)











Microbiology








 Date/Time


Source Procedure


Growth Status


 


 


 8/29/20 07:35


Nasopharynx SARS-CoV-2 RdRp Gene Assay - Final Complete

















Rishabh Sweet MD Aug 29, 2020 14:37

## 2020-08-29 NOTE — NUR
NURSE NOTES:

Dr Fabian said to give methadone for pain as scheduled and roxicodone for BTP only.  Dr Aguilar put in anxiety med ativan q 6 prn. Brian said to contact Dr Aguilar and Dr Galvez 
and Micki

## 2020-08-30 VITALS — DIASTOLIC BLOOD PRESSURE: 107 MMHG | SYSTOLIC BLOOD PRESSURE: 180 MMHG

## 2020-08-30 VITALS — SYSTOLIC BLOOD PRESSURE: 150 MMHG | DIASTOLIC BLOOD PRESSURE: 91 MMHG

## 2020-08-30 VITALS — SYSTOLIC BLOOD PRESSURE: 147 MMHG | DIASTOLIC BLOOD PRESSURE: 89 MMHG

## 2020-08-30 VITALS — SYSTOLIC BLOOD PRESSURE: 180 MMHG | DIASTOLIC BLOOD PRESSURE: 110 MMHG

## 2020-08-30 VITALS — SYSTOLIC BLOOD PRESSURE: 142 MMHG | DIASTOLIC BLOOD PRESSURE: 94 MMHG

## 2020-08-30 VITALS — DIASTOLIC BLOOD PRESSURE: 86 MMHG | SYSTOLIC BLOOD PRESSURE: 130 MMHG

## 2020-08-30 VITALS — DIASTOLIC BLOOD PRESSURE: 99 MMHG | SYSTOLIC BLOOD PRESSURE: 155 MMHG

## 2020-08-30 LAB
% IRON SATURATION: 7 % (ref 15–50)
ADD MANUAL DIFF: YES
ALBUMIN SERPL-MCNC: 3.3 G/DL (ref 3.4–5)
ALP SERPL-CCNC: 84 U/L (ref 46–116)
ALT SERPL-CCNC: 17 U/L (ref 12–78)
ANION GAP SERPL CALC-SCNC: 7 MMOL/L (ref 5–15)
AST SERPL-CCNC: 13 U/L (ref 15–37)
BILIRUB DIRECT SERPL-MCNC: < 0.1 MG/DL (ref 0–0.3)
BILIRUB SERPL-MCNC: 0.3 MG/DL (ref 0.2–1)
BUN SERPL-MCNC: 15 MG/DL (ref 7–18)
CALCIUM SERPL-MCNC: 9 MG/DL (ref 8.5–10.1)
CHLORIDE SERPL-SCNC: 102 MMOL/L (ref 98–107)
CO2 SERPL-SCNC: 30 MMOL/L (ref 21–32)
CREAT SERPL-MCNC: 1.2 MG/DL (ref 0.55–1.3)
ERYTHROCYTE [DISTWIDTH] IN BLOOD BY AUTOMATED COUNT: 15.7 % (ref 11.6–14.8)
FERRITIN SERPL-MCNC: 13 NG/ML (ref 8–388)
HCT VFR BLD CALC: 34.7 % (ref 42–52)
HGB BLD-MCNC: 10.6 G/DL (ref 14.2–18)
IRON SERPL-MCNC: 28 UG/DL (ref 50–175)
MCV RBC AUTO: 85 FL (ref 80–99)
PHOSPHATE SERPL-MCNC: 3.4 MG/DL (ref 2.5–4.9)
PLATELET # BLD: 254 K/UL (ref 150–450)
POTASSIUM SERPL-SCNC: 3.7 MMOL/L (ref 3.5–5.1)
RBC # BLD AUTO: 4.06 M/UL (ref 4.7–6.1)
SODIUM SERPL-SCNC: 139 MMOL/L (ref 136–145)
TIBC SERPL-MCNC: 375 UG/DL (ref 250–450)
UNSATURATED IRON BINDING: 347 UG/DL (ref 112–346)
WBC # BLD AUTO: 3.1 K/UL (ref 4.8–10.8)

## 2020-08-30 RX ADMIN — LORAZEPAM PRN MG: 0.5 TABLET ORAL at 12:00

## 2020-08-30 RX ADMIN — OXYCODONE HYDROCHLORIDE PRN MG: 15 TABLET ORAL at 05:57

## 2020-08-30 RX ADMIN — MAGNESIUM OXIDE TAB 400 MG (241.3 MG ELEMENTAL MG) SCH MG: 400 (241.3 MG) TAB at 12:56

## 2020-08-30 RX ADMIN — INSULIN ASPART SCH UNITS: 100 INJECTION, SOLUTION INTRAVENOUS; SUBCUTANEOUS at 20:35

## 2020-08-30 RX ADMIN — OXYCODONE HYDROCHLORIDE PRN MG: 5 TABLET ORAL at 18:58

## 2020-08-30 RX ADMIN — CARVEDILOL SCH MG: 25 TABLET, FILM COATED ORAL at 08:41

## 2020-08-30 RX ADMIN — CLONIDINE HYDROCHLORIDE SCH MG: 0.2 TABLET ORAL at 17:04

## 2020-08-30 RX ADMIN — LORAZEPAM PRN MG: 0.5 TABLET ORAL at 18:07

## 2020-08-30 RX ADMIN — LORAZEPAM PRN MG: 0.5 TABLET ORAL at 05:57

## 2020-08-30 RX ADMIN — CARVEDILOL SCH MG: 25 TABLET, FILM COATED ORAL at 20:33

## 2020-08-30 RX ADMIN — CLONIDINE HYDROCHLORIDE SCH MG: 0.2 TABLET ORAL at 08:41

## 2020-08-30 RX ADMIN — MAGNESIUM OXIDE TAB 400 MG (241.3 MG ELEMENTAL MG) SCH MG: 400 (241.3 MG) TAB at 17:04

## 2020-08-30 RX ADMIN — OXYCODONE HYDROCHLORIDE PRN MG: 5 TABLET ORAL at 12:55

## 2020-08-30 RX ADMIN — HYDROMORPHONE HYDROCHLORIDE PRN MG: 2 TABLET ORAL at 20:29

## 2020-08-30 RX ADMIN — INSULIN ASPART SCH UNITS: 100 INJECTION, SOLUTION INTRAVENOUS; SUBCUTANEOUS at 11:58

## 2020-08-30 RX ADMIN — INSULIN ASPART SCH UNITS: 100 INJECTION, SOLUTION INTRAVENOUS; SUBCUTANEOUS at 17:06

## 2020-08-30 RX ADMIN — HYDROMORPHONE HYDROCHLORIDE PRN MG: 2 TABLET ORAL at 10:43

## 2020-08-30 RX ADMIN — HYDROMORPHONE HYDROCHLORIDE PRN MG: 2 TABLET ORAL at 16:01

## 2020-08-30 RX ADMIN — INSULIN ASPART SCH UNITS: 100 INJECTION, SOLUTION INTRAVENOUS; SUBCUTANEOUS at 06:09

## 2020-08-30 NOTE — NUR
NURSE HAND-OFF REPORT: 



Important Events on Shift:Pain Management

Patient Status: Stable

Diet: Cardiac



Pending Orders: to see Dr Fabian- pt is requesting for pain management and is unhappy with 
current pain regimen

Pending Results/Labs:OB stool, c diff stool, labs this am

Pending MD notification:NA



Latest Vital Signs: Temperature 97.7 , Pulse 75 , B/P 155/97 Respiratory Rate 20 , O2 SAT 
100 , Room Air,   

Vital Sign Comment: hypertensive



EKG Rhythm: Sinus Rhythm

Rhythm change?: N 





Latest Fong Fall Score: 20  

Fall Risk: Low Risk 

Safety Measures: Call light Within Reach, Bed Alarm Zone 1, Side Rails Side Rails x2, Bed 
position Low and Locked.

Fall Precautions: 

Patient Fall Education



Report given to Afsoon/RN.

## 2020-08-30 NOTE — CONSULTATION
History of Present Illness


General


Date patient seen:  Aug 30, 2020


Chief Complaint:  





Present Illness


Allergies:  


Coded Allergies:  


     HYDRALAZINE (Verified  Allergy, Severe, angio edema, 4/28/18)


 angio edema


     ETODOLAC (Unverified  Allergy, Mild, 4/28/18)


     AMLODIPINE (Unverified  Allergy, Unknown, 4/28/18)


 tolerates nifedipine 8/3/16


     ASPIRIN (Verified  Allergy, Unknown, 4/28/18)


     ATENOLOL (Verified  Allergy, Unknown, 4/28/18)


 tolerates metoprolol 8/3/16


     DIPHENHYDRAMINE (Verified  Allergy, Unknown, 4/28/18)


     HYDROCHLOROTHIAZIDE (Verified  Allergy, Unknown, 4/28/18)


     LABETALOL (Verified  Allergy, Unknown, 4/28/18)


 tolerates metoprolol 8/3/16


     LISINOPRIL (Unverified  Allergy, Unknown, 4/26/16)


     LORATADINE (Verified  Allergy, Unknown, 4/28/18)


     MILK (Verified  Allergy, Unknown, 4/28/18)


     MORPHINE (Verified  Allergy, Unknown, 4/28/18)


     NICARDIPINE (Verified  Allergy, Unknown, 4/28/18)


 tolerates nifedipine 8/3/16


     NITROGLYCERIN (Unverified  Allergy, Unknown, 4/28/18)


     PENICILLINS (Unverified  Allergy, Unknown, 4/28/18)


 tolerates cephalexin 8/3/16


     [place] Nut (Verified  Allergy, Unknown, 4/28/18)


     SHELLFISH DERIVED (Verified  Allergy, Unknown, 4/28/18)





Medication History


Scheduled


Citalopram Hydrobromide* (Celexa*), 20 MG ORAL DAILY, (Reported)


Citalopram Hydrobromide* (Celexa*), 20 MG ORAL DAILY


Clonidine HCl (Clonidine HCl), 0.3 MG PO THREE TIMES A DAY, (Reported)


Clorazepate Dipotassium (Tranxene T-Tab), 7.5 MG PO TWICE A DAY, (Reported)


Hydromorphone HCl (Dilaudid), 2 MG ORAL Q4H


Insulin Glargine (Lantus), 35 SUBQ BEDTIME, (Reported)


Losartan Potassium (Cozaar), 100 MG ORAL DAILY, (Reported)


Metoprolol Tartrate* (Metoprolol Tartrate*), 50 MG ORAL BID, (Reported)


Nifedipine Er* (Adalat Cc*), 60 MG ORAL TWICE A DAY, (Reported)





Scheduled PRN


Diazepam* (Valium*), 10 MG ORAL TID PRN for ANXIETY, (Reported)


Esomeprazole Magnesium (Nexium), 20 MG ORAL DAILY PRN for AD, (Reported)


Hydrocodone Bit/Acetaminophen * (Norco *), 1 TAB ORAL Q4H PRN for 

For Pain, (Reported)


OXYCODONE HCl* (Roxicodone*), 15 MG ORAL Q6H PRN for For Pain, (Reported)





Miscellaneous Medications


Insulin Lispro (Humalog), 0 SUBQ, (Reported)





Patient History


Healthcare decision maker





Resuscitation status





Advanced Directive on File








Physical Exam





Last 24 Hour Vital Signs








  Date Time  Temp Pulse Resp B/P (MAP) Pulse Ox O2 Delivery O2 Flow Rate FiO2


 


8/30/20 08:41  93  180/107    


 


8/30/20 08:41    180/107    


 


8/30/20 08:00 97.0 93 20 180/107 (131) 98   


 


8/30/20 07:48  89      


 


8/30/20 06:27  85 20 155/99 97   


 


8/30/20 05:57  85 20 155/99 97   


 


8/30/20 04:17 96.8 85 20 155/99 (117) 97   


 


8/30/20 04:00  83      


 


8/30/20 00:00  87      


 


8/30/20 00:00 97.7 90 20 180/110 (133) 97   


 


8/29/20 23:22  75 20 175/110 100   


 


8/29/20 21:38  75  175/110    


 


8/29/20 21:00      Room Air  


 


8/29/20 20:00  72      


 


8/29/20 20:00 97.7 79 20 182/116 (138) 95   


 


8/29/20 17:27    175/110    


 


8/29/20 17:26  75  175/110    


 


8/29/20 16:00 97.7 75 20 175/110 (131) 100   


 


8/29/20 16:00  71      


 


8/29/20 13:20    195/117    


 


8/29/20 12:00  89      


 


8/29/20 11:08 96.3 75 18 195/117 (143) 97   


 


8/29/20 11:08      Room Air  


 


8/29/20 10:20 98.3 83 16 193/130 100 Room Air  

















Intake and Output  


 


 8/29/20 8/30/20





 19:00 07:00


 


Intake Total 940 ml 280 ml


 


Balance 940 ml 280 ml


 


  


 


Intake Oral 940 ml 280 ml


 


# Voids 2 2


 


# Bowel Movements 2 











Laboratory Tests








Test


  8/29/20


17:09 8/29/20


21:08 8/30/20


06:01 8/30/20


06:06


 


POC Whole Blood Glucose


  Pending  


  237 MG/DL


()  H 333 MG/DL


()  H 


 


 


White Blood Count


  


  


  


  3.1 K/UL


(4.8-10.8)  L


 


Red Blood Count


  


  


  


  4.06 M/UL


(4.70-6.10)  L


 


Hemoglobin


  


  


  


  10.6 G/DL


(14.2-18.0)  L


 


Hematocrit


  


  


  


  34.7 %


(42.0-52.0)  L


 


Mean Corpuscular Volume    85 FL (80-99)  


 


Mean Corpuscular Hemoglobin


  


  


  


  26.0 PG


(27.0-31.0)  L


 


Mean Corpuscular Hemoglobin


Concent 


  


  


  30.4 G/DL


(32.0-36.0)  L


 


Red Cell Distribution Width


  


  


  


  15.7 %


(11.6-14.8)  H


 


Platelet Count


  


  


  


  254 K/UL


(150-450)


 


Mean Platelet Volume


  


  


  


  7.9 FL


(6.5-10.1)


 


Neutrophils (%) (Auto)


  


  


  


  % (45.0-75.0)


 


 


Lymphocytes (%) (Auto)


  


  


  


  % (20.0-45.0)


 


 


Monocytes (%) (Auto)     % (1.0-10.0)  


 


Eosinophils (%) (Auto)     % (0.0-3.0)  


 


Basophils (%) (Auto)     % (0.0-2.0)  


 


Neutrophils % (Manual)    Pending  


 


Lymphocytes % (Manual)    Pending  


 


Platelet Estimate    Pending  


 


Platelet Morphology    Pending  


 


Sodium Level    Pending  


 


Potassium Level    Pending  


 


Chloride Level    Pending  


 


Carbon Dioxide Level    Pending  


 


Anion Gap


  


  


  


  7 mmol/L


(5-15)


 


Blood Urea Nitrogen    Pending  


 


Creatinine    Pending  


 


Estimat Glomerular Filtration


Rate 


  


  


  Pending  


 


 


Glucose Level    Pending  


 


Uric Acid


  


  


  


  5.2 MG/DL


(2.6-7.2)


 


Calcium Level    Pending  


 


Phosphorus Level


  


  


  


  3.4 MG/DL


(2.5-4.9)


 


Magnesium Level


  


  


  


  1.5 MG/DL


(1.8-2.4)  L


 


Iron Level


  


  


  


  28 ug/dL


()  L


 


Total Iron Binding Capacity


  


  


  


  375 ug/dL


(250-450)


 


Percent Iron Saturation    7 % (15-50)  L


 


Unsaturated Iron Binding


  


  


  


  347 ug/dL


(112-346)  H


 


Ferritin


  


  


  


  13 NG/ML


(8-388)


 


Total Bilirubin


  


  


  


  0.3 MG/DL


(0.2-1.0)


 


Direct Bilirubin


  


  


  


  < 0.1 MG/DL


(0.0-0.3)


 


Aspartate Amino Transf


(AST/SGOT) 


  


  


  13 U/L (15-37)


L


 


Alanine Aminotransferase


(ALT/SGPT) 


  


  


  17 U/L (12-78)


 


 


Alkaline Phosphatase


  


  


  


  84 U/L


()


 


Troponin I


  


  


  


  0.002 ng/mL


(0.000-0.056)


 


Total Protein


  


  


  


  7.0 G/DL


(6.4-8.2)


 


Albumin


  


  


  


  3.3 G/DL


(3.4-5.0)  L


 


Globulin    Pending  


 


Vitamin B12 Level


  


  


  


  208 PG/ML


(193-986)


 


Folate


  


  


  


  6.3 NG/ML


(8.6-58.9)  L








Height (Feet):  6


Height (Inches):  2.00


Weight (Pounds):  234


Medications





Current Medications








 Medications


  (Trade)  Dose


 Ordered  Sig/Kirby


 Route


 PRN Reason  Start Time


 Stop Time Status Last Admin


Dose Admin


 


 Carvedilol


  (Coreg)  25 mg  EVERY 12  HOURS


 ORAL


   8/29/20 21:00


 9/28/20 20:59  8/30/20 08:41


 


 


 Clonidine HCl


  (Catapres tab)  0.2 mg  BID


 ORAL


   8/29/20 13:00


 11/27/20 12:59  8/30/20 08:41


 


 


 Dextrose


  (Dextrose 50%)  25 ml  Q30M  PRN


 IV


 Hypoglycemia  8/29/20 12:00


 11/27/20 11:59   


 


 


 Dextrose


  (Dextrose 50%)  50 ml  Q30M  PRN


 IV


 Hypoglycemia  8/29/20 12:00


 11/27/20 11:59   


 


 


 Gabapentin


  (Neurontin)  300 mg  THREE TIMES A  DAY


 ORAL


   8/29/20 22:00


 9/28/20 21:59   


 


 


 Insulin Aspart


  (NovoLOG)    BEFORE MEALS AND  HS


 SUBQ


   8/29/20 16:30


 11/27/20 16:29  8/30/20 06:09


 


 


 Lorazepam


  (Ativan)  1 mg  Q6H  PRN


 ORAL


 For Anxiety  8/29/20 22:30


 9/5/20 22:29  8/30/20 05:57


 


 


 Methadone HCl


  (Methadone HCl)  10 mg  Q6H


 ORAL


   8/29/20 23:00


 9/5/20 22:59   


 


 


 Nifedipine


  (Procardia XL)  60 mg  TWICE A  DAY


 ORAL


   8/29/20 18:00


 9/28/20 17:59  8/29/20 17:26


 


 


 Oxycodone HCl


  (Roxicodone)  15 mg  Q6H  PRN


 ORAL


 Severe Breakthru Pain (>7)  8/29/20 22:00


 9/5/20 11:44  8/30/20 05:57


 











Assessment/Plan


Assessment/Plan:


(1) Lumbar DDD


(2) Lumbar Spondylosis


(3) Lumbar Herniated disc


(4) Lumbar Radiculopathy


seen dictated











Chong Cintron Aug 30, 2020 10:09

## 2020-08-30 NOTE — NUR
Patient complaining about his Pain Medication orders; FRANCESCO Mcnulty, visited patient in 
consult, spoke with patient and adjusted his pain medications. Patient wants an explanation 
why his medication dose was decreased, Chong Cintron paged and requested for him to talk to 
patient and explain his plan of action; he said he will call patient back.

## 2020-08-30 NOTE — NUR
NURSE HAND-OFF REPORT: 



Important Events on Shift:

Patient Status: 

Diet: 



Pending Orders: 

Pending Results/Labs:

Pending MD notification:



Latest Vital Signs: Temperature 97.9 , Pulse 84 , B/P 147 /89 , Respiratory Rate 20 , O2 SAT 
98 , Room Air, O2 Flow Rate .  

Vital Sign Comment: 



EKG Rhythm: Sinus Rhythm

Rhythm change?: N 

MD Notified?: -

MD Response: 



Latest Fong Fall Score: 20  

Fall Risk: Low Risk 

Safety Measures: Call light Within Reach, Bed Alarm Zone 1, Side Rails Side Rails x2, Bed 
position Low and Locked.

Fall Precautions: 

Yellow Socks

Yellow Gown

Door Sign

Patient Fall Education



Report given to . Pt is awake and stable, no stress noted. Endorsed plan of care, endorsed 
to F/U RELEASE HEALTH INFORMATION FOR D/C SUMMARU AND 2DECHO tomorrow morning. and endorsed 
to F/U STOOL CULTURE.

## 2020-08-30 NOTE — NUR
NURSE NOTES:

Dr Wilkerson ordered to contact [facility] and ask them to release health 
information regarding discharge summary and 2DEcho result, pt read very carefully and signed 
consent form to release of protected health information regarding D/C summary and 2decho 
result. RN called [facility] to get fax number but FLAKO Mone stated she 
doesn't have medical record fax number and gave RN ph:[phone] to get fax number 
on Monday morning, SONAM Britton is aware. will endorse to next shift to F/U Monday morning.

## 2020-08-30 NOTE — GENERAL PROGRESS NOTE
Assessment/Plan


Status:  unchanged


Assessment/Plan:


Assessment


- diarrhea


- mild anemia with Iron deficiency


- HTN


- IDDM





Recommendations


- await stool Cx


- stool OB


- needs eventual EGD/Colon





Subjective


Allergies:  


Coded Allergies:  


     HYDRALAZINE (Verified  Allergy, Severe, angio edema, 4/28/18)


 angio edema


     ETODOLAC (Unverified  Allergy, Mild, 4/28/18)


     AMLODIPINE (Unverified  Allergy, Unknown, 4/28/18)


 tolerates nifedipine 8/3/16


     ASPIRIN (Verified  Allergy, Unknown, 4/28/18)


     ATENOLOL (Verified  Allergy, Unknown, 4/28/18)


 tolerates metoprolol 8/3/16


     DIPHENHYDRAMINE (Verified  Allergy, Unknown, 4/28/18)


     HYDROCHLOROTHIAZIDE (Verified  Allergy, Unknown, 4/28/18)


     LABETALOL (Verified  Allergy, Unknown, 4/28/18)


 tolerates metoprolol 8/3/16


     LISINOPRIL (Unverified  Allergy, Unknown, 4/26/16)


     LORATADINE (Verified  Allergy, Unknown, 4/28/18)


     MILK (Verified  Allergy, Unknown, 4/28/18)


     MORPHINE (Verified  Allergy, Unknown, 4/28/18)


     NICARDIPINE (Verified  Allergy, Unknown, 4/28/18)


 tolerates nifedipine 8/3/16


     NITROGLYCERIN (Unverified  Allergy, Unknown, 4/28/18)


     PENICILLINS (Unverified  Allergy, Unknown, 4/28/18)


 tolerates cephalexin 8/3/16


     [place] Nut (Verified  Allergy, Unknown, 4/28/18)


     SHELLFISH DERIVED (Verified  Allergy, Unknown, 4/28/18)


Subjective


above noted 


still with abd pain and diarrhea


no stool sample sent yet


iron panel noted - Iron deficient





Objective





Last 24 Hour Vital Signs








  Date Time  Temp Pulse Resp B/P (MAP) Pulse Ox O2 Delivery O2 Flow Rate FiO2


 


8/30/20 12:30  80 19 130/85 97   


 


8/30/20 12:29  85      


 


8/30/20 12:00 98.1 80 20 130/86 (101) 95   


 


8/30/20 12:00  80 19 130/85 97   


 


8/30/20 11:56  80  130/85    


 


8/30/20 10:00    150/91 (110)    


 


8/30/20 09:00      Room Air  


 


8/30/20 08:41  93  180/107    


 


8/30/20 08:41    180/107    


 


8/30/20 08:00 97.0 93 20 180/107 (131) 98   


 


8/30/20 07:48  89      


 


8/30/20 05:57  85 20 155/99 97   


 


8/30/20 04:17 96.8 85 20 155/99 (117) 97   


 


8/30/20 04:00  83      


 


8/30/20 00:00  87      


 


8/30/20 00:00 97.7 90 20 180/110 (133) 97   


 


8/29/20 23:22  75 20 175/110 100   


 


8/29/20 21:38  75  175/110    


 


8/29/20 21:00      Room Air  


 


8/29/20 20:00  72      


 


8/29/20 20:00 97.7 79 20 182/116 (138) 95   


 


8/29/20 17:27    175/110    


 


8/29/20 17:26  75  175/110    


 


8/29/20 16:00 97.7 75 20 175/110 (131) 100   


 


8/29/20 16:00  71      

















Intake and Output  


 


 8/29/20 8/30/20





 19:00 07:00


 


Intake Total 940 ml 280 ml


 


Balance 940 ml 280 ml


 


  


 


Intake Oral 940 ml 280 ml


 


# Voids 2 2


 


# Bowel Movements 2 








Laboratory Tests


8/29/20 17:09: POC Whole Blood Glucose [Pending]


8/29/20 21:08: POC Whole Blood Glucose 237H


8/30/20 06:01: POC Whole Blood Glucose 333H


8/30/20 06:06: 


White Blood Count 3.1L, Red Blood Count 4.06L, Hemoglobin 10.6L, Hematocrit 

34.7L, Mean Corpuscular Volume 85, Mean Corpuscular Hemoglobin 26.0L, Mean 

Corpuscular Hemoglobin Concent 30.4L, Red Cell Distribution Width 15.7H, 

Platelet Count 254, Mean Platelet Volume 7.9, Neutrophils (%) (Auto) , 

Lymphocytes (%) (Auto) , Monocytes (%) (Auto) , Eosinophils (%) (Auto) , 

Basophils (%) (Auto) , Differential Total Cells Counted 100, Neutrophils % (

Manual) 47, Lymphocytes % (Manual) 39, Monocytes % (Manual) 8, Eosinophils % (

Manual) 5H, Basophils % (Manual) 1, Band Neutrophils 0, Platelet Estimate 

Adequate, Platelet Morphology Normal, Hypochromasia 1+, Anisocytosis 1+, Sodium 

Level [Pending], Potassium Level [Pending], Chloride Level [Pending], Carbon 

Dioxide Level [Pending], Anion Gap 7, Blood Urea Nitrogen [Pending], Creatinine 

[Pending], Estimat Glomerular Filtration Rate [Pending], Glucose Level [Pending]

, Uric Acid 5.2, Calcium Level [Pending], Phosphorus Level 3.4, Magnesium Level 

1.5L, Iron Level 28L, Total Iron Binding Capacity 375, Percent Iron Saturation 

7L, Unsaturated Iron Binding 347H, Ferritin 13, Total Bilirubin 0.3, Direct 

Bilirubin < 0.1, Aspartate Amino Transf (AST/SGOT) 13L, Alanine 

Aminotransferase (ALT/SGPT) 17, Alkaline Phosphatase 84, Troponin I 0.002, 

Total Protein 7.0, Albumin 3.3L, Globulin [Pending], Vitamin B12 Level 208, 

Folate 6.3L


8/30/20 11:51: POC Whole Blood Glucose 280H


Height (Feet):  6


Height (Inches):  2.00


Weight (Pounds):  234


Objective


WDWN


NCAT


supple


CTA


RR


abd soft 


no edema











Angi Allen MD Aug 30, 2020 14:57

## 2020-08-30 NOTE — CARDIAC ELECTROPHYSIOLOGY PN
Assessment/Plan


Assessment/Plan


1. Accelerated hypertension.  On Coreg  25 mg b.i.d.  Procardia 60 mg b.i.d. 

and clonidine 0.2 mg b.i.d.and p.r.n. clonidine to his medical regimen.


   Offered to change Procardia and Clonidine to Lasix 40 po bid and Diovan 80 

bid but refusing.





2. CHF EF 35%. Says EF was 15% at [facility] 3 months ago and was offered ICD 


   Refusing IV access. On Coreg. allergic to Lisinopril and Hydralazine. Added 

Lasix 40 po bid and Diovan 80 bid but refusing


   Will get report from [facility] at [place].





3. Chest pain.  Troponins are negative.   EKG shows LVH and repolarization 

abnormality Echo EF 35%





4. History of pulmonary embolus.  The patient underwent a CT of the


chest, abdomen, and pelvis that showed no acute findings, and a CT of


abdomen and pelvis was also normal.





5. History of noncompliance.


6. History of depression and anxiety.





Subjective


Subjective


Complains of CP. ECho EF 35%-40% preliminary report





Objective





Last 24 Hour Vital Signs








  Date Time  Temp Pulse Resp B/P (MAP) Pulse Ox O2 Delivery O2 Flow Rate FiO2


 


8/30/20 12:30  80 19 130/85 97   


 


8/30/20 12:29  85      


 


8/30/20 12:00 98.1 80 20 130/86 (101) 95   


 


8/30/20 12:00  80 19 130/85 97   


 


8/30/20 11:56  80  130/85    


 


8/30/20 10:00    150/91 (110)    


 


8/30/20 09:00      Room Air  


 


8/30/20 08:41  93  180/107    


 


8/30/20 08:41    180/107    


 


8/30/20 08:00 97.0 93 20 180/107 (131) 98   


 


8/30/20 07:48  89      


 


8/30/20 05:57  85 20 155/99 97   


 


8/30/20 04:17 96.8 85 20 155/99 (117) 97   


 


8/30/20 04:00  83      


 


8/30/20 00:00  87      


 


8/30/20 00:00 97.7 90 20 180/110 (133) 97   


 


8/29/20 23:22  75 20 175/110 100   


 


8/29/20 21:38  75  175/110    


 


8/29/20 21:00      Room Air  


 


8/29/20 20:00  72      


 


8/29/20 20:00 97.7 79 20 182/116 (138) 95   


 


8/29/20 17:27    175/110    


 


8/29/20 17:26  75  175/110    


 


8/29/20 16:00 97.7 75 20 175/110 (131) 100   


 


8/29/20 16:00  71      

















Intake and Output  


 


 8/29/20 8/30/20





 19:00 07:00


 


Intake Total 940 ml 280 ml


 


Balance 940 ml 280 ml


 


  


 


Intake Oral 940 ml 280 ml


 


# Voids 2 2


 


# Bowel Movements 2 











Laboratory Tests








Test


  8/29/20


17:09 8/29/20


21:08 8/30/20


06:01 8/30/20


06:06


 


POC Whole Blood Glucose


  Pending  


  237 MG/DL


()  H 333 MG/DL


()  H 


 


 


White Blood Count


  


  


  


  3.1 K/UL


(4.8-10.8)  L


 


Red Blood Count


  


  


  


  4.06 M/UL


(4.70-6.10)  L


 


Hemoglobin


  


  


  


  10.6 G/DL


(14.2-18.0)  L


 


Hematocrit


  


  


  


  34.7 %


(42.0-52.0)  L


 


Mean Corpuscular Volume    85 FL (80-99)  


 


Mean Corpuscular Hemoglobin


  


  


  


  26.0 PG


(27.0-31.0)  L


 


Mean Corpuscular Hemoglobin


Concent 


  


  


  30.4 G/DL


(32.0-36.0)  L


 


Red Cell Distribution Width


  


  


  


  15.7 %


(11.6-14.8)  H


 


Platelet Count


  


  


  


  254 K/UL


(150-450)


 


Mean Platelet Volume


  


  


  


  7.9 FL


(6.5-10.1)


 


Neutrophils (%) (Auto)


  


  


  


  % (45.0-75.0)


 


 


Lymphocytes (%) (Auto)


  


  


  


  % (20.0-45.0)


 


 


Monocytes (%) (Auto)     % (1.0-10.0)  


 


Eosinophils (%) (Auto)     % (0.0-3.0)  


 


Basophils (%) (Auto)     % (0.0-2.0)  


 


Differential Total Cells


Counted 


  


  


  100  


 


 


Neutrophils % (Manual)    47 % (45-75)  


 


Lymphocytes % (Manual)    39 % (20-45)  


 


Monocytes % (Manual)    8 % (1-10)  


 


Eosinophils % (Manual)    5 % (0-3)  H


 


Basophils % (Manual)    1 % (0-2)  


 


Band Neutrophils    0 % (0-8)  


 


Platelet Estimate    Adequate  


 


Platelet Morphology    Normal  


 


Hypochromasia    1+  


 


Anisocytosis    1+  


 


Sodium Level    Pending  


 


Potassium Level    Pending  


 


Chloride Level    Pending  


 


Carbon Dioxide Level    Pending  


 


Anion Gap


  


  


  


  7 mmol/L


(5-15)


 


Blood Urea Nitrogen    Pending  


 


Creatinine    Pending  


 


Estimat Glomerular Filtration


Rate 


  


  


  Pending  


 


 


Glucose Level    Pending  


 


Uric Acid


  


  


  


  5.2 MG/DL


(2.6-7.2)


 


Calcium Level    Pending  


 


Phosphorus Level


  


  


  


  3.4 MG/DL


(2.5-4.9)


 


Magnesium Level


  


  


  


  1.5 MG/DL


(1.8-2.4)  L


 


Iron Level


  


  


  


  28 ug/dL


()  L


 


Total Iron Binding Capacity


  


  


  


  375 ug/dL


(250-450)


 


Percent Iron Saturation    7 % (15-50)  L


 


Unsaturated Iron Binding


  


  


  


  347 ug/dL


(112-346)  H


 


Ferritin


  


  


  


  13 NG/ML


(8-388)


 


Total Bilirubin


  


  


  


  0.3 MG/DL


(0.2-1.0)


 


Direct Bilirubin


  


  


  


  < 0.1 MG/DL


(0.0-0.3)


 


Aspartate Amino Transf


(AST/SGOT) 


  


  


  13 U/L (15-37)


L


 


Alanine Aminotransferase


(ALT/SGPT) 


  


  


  17 U/L (12-78)


 


 


Alkaline Phosphatase


  


  


  


  84 U/L


()


 


Troponin I


  


  


  


  0.002 ng/mL


(0.000-0.056)


 


Total Protein


  


  


  


  7.0 G/DL


(6.4-8.2)


 


Albumin


  


  


  


  3.3 G/DL


(3.4-5.0)  L


 


Globulin    Pending  


 


Vitamin B12 Level


  


  


  


  208 PG/ML


(193-986)


 


Folate


  


  


  


  6.3 NG/ML


(8.6-58.9)  L


 


Test


  8/30/20


11:51 


  


  


 


 


POC Whole Blood Glucose


  280 MG/DL


()  H 


  


  


 











Microbiology








 Date/Time


Source Procedure


Growth Status


 


 


 8/29/20 07:35


Nasopharynx SARS-CoV-2 RdRp Gene Assay - Final Complete








Objective





HEAD AND NECK:  No JVD.


LUNGS:  Clear.


CARDIOVASCULAR:  Shows regular S1 and S2 with no gallop or murmur.


 


ABDOMEN:  Soft.


EXTREMITIES:  No pitting edema.











Rishabh Sweet MD Aug 30, 2020 15:20

## 2020-08-30 NOTE — NUR
NURSE NOTES:

Received pt from SARAH Herrera, pt is awake and alert, pt is in RA, no SOB or acute 
respiratory distress noted. pt is on continues heart monitoring. pt has no intact iv access 
and refuses to have one, MD is aware. Dr Torres visited pt and will F/U for complaining pain. 
all needs attended, bed is locked and is in the lowest position, call light within easy 
reach. will continue to monitor.

## 2020-08-30 NOTE — CONSULTATION
Consult Note


Consult Note


I am asked to evaluate the patient at the request of Dr. Torres for electrolyte 

imbalances and proteinuria


Patient hard to get any history.  When seen he only is asking for pain 

medications.





45-year-old male with one episode of vomiting and diarrhea earlier tonight and 

generalized abdominal pain.  Patient says that he was eating dinner and then 

several hours later began to feel generalized abdominal cramping and pain and 

vomited once and had one episode of diarrhea.  Vomiting was nonbilious nonbloody

, only occurred 1 time.  He had one episode of diarrhea nonbloody.  Pain is 

sharp in nature, located diffusely, poorly localized.  He is in no distress 

whatsoever in the emergency department.  Denies fevers, chills, chest pain, 

palpitation, shortness of breath, back pain, dysuria, hematuria.  Patient 

claims that he has CHF with an ejection fraction of 16% and 2 pulmonary 

embolisms in the past but not on any anticoagulation because "they said I did 

not need it anymore."


On arrival the patient was immediately demanding Dilaudid and became very 

agitated when I told him that there was no indication for this.








ER note::


Allergies:  


Coded Allergies:  


     HYDRALAZINE (Verified  Allergy, Severe, angio edema, 4/28/18)


 angio edema


     ETODOLAC (Unverified  Allergy, Mild, 4/28/18)


     AMLODIPINE (Unverified  Allergy, Unknown, 4/28/18)


 tolerates nifedipine 8/3/16


     ASPIRIN (Verified  Allergy, Unknown, 4/28/18)


     ATENOLOL (Verified  Allergy, Unknown, 4/28/18)


 tolerates metoprolol 8/3/16


     DIPHENHYDRAMINE (Verified  Allergy, Unknown, 4/28/18)


     HYDROCHLOROTHIAZIDE (Verified  Allergy, Unknown, 4/28/18)


     LABETALOL (Verified  Allergy, Unknown, 4/28/18)


 tolerates metoprolol 8/3/16


     LISINOPRIL (Unverified  Allergy, Unknown, 4/26/16)


     LORATADINE (Verified  Allergy, Unknown, 4/28/18)


     MILK (Verified  Allergy, Unknown, 4/28/18)


     MORPHINE (Verified  Allergy, Unknown, 4/28/18)


     NICARDIPINE (Verified  Allergy, Unknown, 4/28/18)


 tolerates nifedipine 8/3/16


     NITROGLYCERIN (Unverified  Allergy, Unknown, 4/28/18)


     PENICILLINS (Unverified  Allergy, Unknown, 4/28/18)


 tolerates cephalexin 8/3/16


     Racine Nut (Verified  Allergy, Unknown, 4/28/18)


     SHELLFISH DERIVED (Verified  Allergy, Unknown, 4/28/18)





COVID-19 Screening


Contact w/high risk pt:  No


Experienced COVID-19 symptoms?:  No


COVID-19 Testing performed PTA:  No








Past Medical History:  No History, Except For


Hx Cardiac Problems:  Yes - loop recorder placed in 2019


Hx Hypertension:  Yes


Hx Diabetes:  Yes


Hx Gastrointestinal Problems:  Yes - peptic ulcer


Hx Dialysis:  No - spinal stenosis


Hx Neurological Problems:  Yes - spinal stenosis


Assessment/Plan





45-year-old male is admitted with chest pain rule out coronary artery disease


He has history of diabetes mellitus and hypertension


He has 3+ proteinuria that indicative of nephropathy due to hypertension and or 

diabetes mellitus


Renal parameters and electrolytes are all normal today


Patient argumentative and demanding pain shots for having pain all over his body


I explained to the patient that I am a [facility] and not the pain 

 , he he however at the end told me not to come back since 

I think I am smarter than him !!!











Suggestions:





Blood pressure management per cardiology advice, already in place


Blood sugar management


Will sign off at this time since the patient is not willing to see me for not 

giving him pain medication


Discussed with RN Afsoon


Defer pain medication to pain management specialist


Thank you











Cesar Galvez MD Aug 30, 2020 08:53

## 2020-08-30 NOTE — NUR
NURSE NOTES:

Pt asking to have nifedipine 12am and 12pm, Dr Wilkerson is aware and ordered to do it, noted 
and carried out. will continue to monitor.

## 2020-08-30 NOTE — CONSULTATION
DATE OF CONSULTATION:  08/30/2020

PAIN MANAGEMENT CONSULTATION



CONSULTING PHYSICIAN:  Behnoush Zarrini, MD.



REFERRING PHYSICIAN:  Madi Townsend DO.



PHYSICIAN ASSISTANT:  FRANCESCO Tijerina.



CHIEF COMPLAINT:  Back pain.



HISTORY OF PRESENT ILLNESS:  This is a 45-year-old male, who is being seen

on the telemetry floor of [facility] for comprehensive pain

management consultation.  Patient is a known patient from previous

hospital admission, now admitted under the care of Dr. Torres with

complaints of chest pain being ruled out for ACS.  He has complaints of

chronic back pain rating at 10/10, described the pain as a sharp, stabbing

pain, radiating down bilateral extremities, increased with movement and is

reduced with medications.  He was started on methadone 10 mg tablet every

6 hours around the clock, hold for oversedation as well as oxycodone 15 mg

tablet every 6 hours as needed for severe pain.  However, patient reports

that methadone and gabapentin, which were started caused him to have

increased abdominal pain and does not want to take those medications.  In

the past, he had been comfortable on oxycodone 10 mg tablet every 6 hours

and Dilaudid 2 mg tablet every 4 hours as needed and has no other

complaints at this time.  We were consulted so patient would have adequate

pain control while here in the hospital.



REVIEW OF SYSTEMS:  Denies rash, fever, chills, sweating, dizziness,

drowsiness, blurred vision, sore throat, or change in weight.  No

shortness of breath or chest pain.  No nausea, vomiting, diarrhea, or

blood in the stool or urine.  No dysuria.



PHYSICAL EXAMINATION:

GENERAL:  Alert, awake, oriented.

VITAL SIGNS:  Blood pressure 180/107, oxygen saturation is 98%, respiratory

rate 20, temperature is 97 degrees Fahrenheit, heart rate is 93.

LUNGS:  Decreased breath sounds bilaterally.

HEART:  S1 and S2 regular.

ABDOMEN:  Soft, nontender.

BACK:  Range of motion is decreased in flexion and extension.

EXTREMITIES:  Upper and lower extremity range of motion is decreased due to

patient's condition.  No cyanosis.  No clubbing.  Sensory is reduced.

Reflexes are not obtainable.  No adenopathy.



ASSESSMENT AND PLAN:  This is a 45-year-old male with lumbar degenerative

disk disease, lumbar stenosis, and herniated disk.  The patient will be

discontinued off methadone, Neurontin, and oxycodone.  We will place

patient on Dilaudid 2 mg tablet as needed for severe pain and oxycodone 10

mg tablet every 6 hours as needed for moderate breakthrough pain.  The

patient was discussed with Dr. Fabian and Dr. Fabian concurred.  We will

follow up with the patient.



Thank you very much for the courtesy of this consultation.







  ______________________________________________

  Behnoush Zarrini, M.D.





  ______________________________________________

  FRANCESCO Tijerina





DR:  MEGHNA

D:  08/30/2020 10:15

T:  08/30/2020 16:11

JOB#:  [id number]

CC:

## 2020-08-30 NOTE — GENERAL PROGRESS NOTE
Assessment/Plan


Problem List:  


(1) PTSD (post-traumatic stress disorder)


ICD Codes:  F43.10 - Post-traumatic stress disorder, unspecified


SNOMED:  77987377


(2) Chronic pain disorder


ICD Codes:  G89.4 - Chronic pain syndrome


SNOMED:  396906209


(3) Hypertensive urgency


ICD Codes:  I16.0 - Hypertensive urgency


SNOMED:  331085149


(4) Abdominal pain


ICD Codes:  R10.9 - Unspecified abdominal pain


SNOMED:  63680653


Qualifiers:  


   Qualified Codes:  R10.12 - Left upper quadrant pain


Status:  unchanged


Assessment/Plan:


pain bp control pt diet cbc bmp am





Subjective


Constitutional:  Reports: weakness


Allergies:  


Coded Allergies:  


     HYDRALAZINE (Verified  Allergy, Severe, angio edema, 4/28/18)


 angio edema


     ETODOLAC (Unverified  Allergy, Mild, 4/28/18)


     AMLODIPINE (Unverified  Allergy, Unknown, 4/28/18)


 tolerates nifedipine 8/3/16


     ASPIRIN (Verified  Allergy, Unknown, 4/28/18)


     ATENOLOL (Verified  Allergy, Unknown, 4/28/18)


 tolerates metoprolol 8/3/16


     DIPHENHYDRAMINE (Verified  Allergy, Unknown, 4/28/18)


     HYDROCHLOROTHIAZIDE (Verified  Allergy, Unknown, 4/28/18)


     LABETALOL (Verified  Allergy, Unknown, 4/28/18)


 tolerates metoprolol 8/3/16


     LISINOPRIL (Unverified  Allergy, Unknown, 4/26/16)


     LORATADINE (Verified  Allergy, Unknown, 4/28/18)


     MILK (Verified  Allergy, Unknown, 4/28/18)


     MORPHINE (Verified  Allergy, Unknown, 4/28/18)


     NICARDIPINE (Verified  Allergy, Unknown, 4/28/18)


 tolerates nifedipine 8/3/16


     NITROGLYCERIN (Unverified  Allergy, Unknown, 4/28/18)


     PENICILLINS (Unverified  Allergy, Unknown, 4/28/18)


 tolerates cephalexin 8/3/16


     [place] Nut (Verified  Allergy, Unknown, 4/28/18)


     SHELLFISH DERIVED (Verified  Allergy, Unknown, 4/28/18)


All Systems:  reviewed and negative except above


Subjective


sl agitated c/o pain





Objective





Last 24 Hour Vital Signs








  Date Time  Temp Pulse Resp B/P (MAP) Pulse Ox O2 Delivery O2 Flow Rate FiO2


 


8/30/20 06:27  85 20 155/99 97   


 


8/30/20 05:57  85 20 155/99 97   


 


8/30/20 04:17 96.8 85 20 155/99 (117) 97   


 


8/30/20 04:00  83      


 


8/30/20 00:00  87      


 


8/30/20 00:00 97.7 90 20 180/110 (133) 97   


 


8/29/20 23:22  75 20 175/110 100   


 


8/29/20 21:38  75  175/110    


 


8/29/20 21:00      Room Air  


 


8/29/20 20:00  72      


 


8/29/20 20:00 97.7 79 20 182/116 (138) 95   


 


8/29/20 17:27    175/110    


 


8/29/20 17:26  75  175/110    


 


8/29/20 16:00 97.7 75 20 175/110 (131) 100   


 


8/29/20 16:00  71      


 


8/29/20 13:20    195/117    


 


8/29/20 12:00  89      


 


8/29/20 11:08 96.3 75 18 195/117 (143) 97   


 


8/29/20 11:08      Room Air  


 


8/29/20 10:20 98.3 83 16 193/130 100 Room Air  


 


8/29/20 10:00 98.3 83 16 185/120 100 Room Air  


 


8/29/20 09:35  83  193/130    


 


8/29/20 09:30 98.3 83 16 193/130 100 Room Air  


 


8/29/20 09:24 98.3       


 


8/29/20 08:54  89  201/115    

















Intake and Output  


 


 8/29/20 8/30/20





 19:00 07:00


 


Intake Total 940 ml 280 ml


 


Balance 940 ml 280 ml


 


  


 


Intake Oral 940 ml 280 ml


 


# Voids 2 2


 


# Bowel Movements 2 








Laboratory Tests


8/29/20 17:09: POC Whole Blood Glucose [Pending]


8/29/20 21:08: POC Whole Blood Glucose 237H


8/30/20 06:01: POC Whole Blood Glucose 333H


8/30/20 06:06: 


White Blood Count 3.1L, Red Blood Count 4.06L, Hemoglobin 10.6L, Hematocrit 

34.7L, Mean Corpuscular Volume 85, Mean Corpuscular Hemoglobin 26.0L, Mean 

Corpuscular Hemoglobin Concent 30.4L, Red Cell Distribution Width 15.7H, 

Platelet Count 254, Mean Platelet Volume 7.9, Neutrophils (%) (Auto) , 

Lymphocytes (%) (Auto) , Monocytes (%) (Auto) , Eosinophils (%) (Auto) , 

Basophils (%) (Auto) , Neutrophils % (Manual) [Pending], Lymphocytes % (Manual) 

[Pending], Platelet Estimate [Pending], Platelet Morphology [Pending], Sodium 

Level [Pending], Potassium Level [Pending], Chloride Level [Pending], Carbon 

Dioxide Level [Pending], Anion Gap 7, Blood Urea Nitrogen [Pending], Creatinine 

[Pending], Estimat Glomerular Filtration Rate [Pending], Glucose Level [Pending]

, Uric Acid [Pending], Calcium Level [Pending], Phosphorus Level [Pending], 

Magnesium Level [Pending], Iron Level [Pending], Unsaturated Iron Binding [

Pending], Ferritin [Pending], Total Bilirubin [Pending], Direct Bilirubin [

Pending], Aspartate Amino Transf (AST/SGOT) [Pending], Alanine Aminotransferase 

(ALT/SGPT) [Pending], Alkaline Phosphatase [Pending], Troponin I 0.002, Total 

Protein [Pending], Albumin [Pending], Globulin [Pending], Vitamin B12 Level [

Pending], Folate [Pending]


Height (Feet):  6


Height (Inches):  2.00


Weight (Pounds):  234


General Appearance:  alert


EENT:  normal ENT inspection


Neck:  normal alignment


Cardiovascular:  normal peripheral pulses, normal rate, regular rhythm


Respiratory/Chest:  chest wall non-tender, lungs clear, normal breath sounds


Abdomen:  normal bowel sounds, non tender, soft


Extremities:  normal inspection


Edema:  no edema noted Arm (L), no edema noted Arm (R), no edema noted Leg (L), 

no edema noted Leg (R), no edema noted Pedal (L), no edema noted Pedal (R), no 

edema noted Generalized


Neurologic:  responsive, motor weakness


Skin:  normal pigmentation, warm/dry











Madi Torres DO Aug 30, 2020 08:34

## 2020-08-30 NOTE — NUR
NURSE NOTES:

Received pt from RN Lazaro, pt is awake and alert, pt is on RA, no SOB or acute respiratory 
distress noted.  pt has no intact iv access and refuses to have one, MD is aware. Dr Torres 
visited pt this morning. bed is locked and is in the lowest position, call light within easy 
reach. will continue to monitor.

## 2020-08-31 VITALS — SYSTOLIC BLOOD PRESSURE: 191 MMHG | DIASTOLIC BLOOD PRESSURE: 117 MMHG

## 2020-08-31 VITALS — DIASTOLIC BLOOD PRESSURE: 98 MMHG | SYSTOLIC BLOOD PRESSURE: 158 MMHG

## 2020-08-31 VITALS — DIASTOLIC BLOOD PRESSURE: 102 MMHG | SYSTOLIC BLOOD PRESSURE: 165 MMHG

## 2020-08-31 VITALS — DIASTOLIC BLOOD PRESSURE: 111 MMHG | SYSTOLIC BLOOD PRESSURE: 186 MMHG

## 2020-08-31 VITALS — DIASTOLIC BLOOD PRESSURE: 115 MMHG | SYSTOLIC BLOOD PRESSURE: 173 MMHG

## 2020-08-31 LAB
ADD MANUAL DIFF: YES
ANION GAP SERPL CALC-SCNC: 8 MMOL/L (ref 5–15)
BUN SERPL-MCNC: 19 MG/DL (ref 7–18)
CALCIUM SERPL-MCNC: 9.2 MG/DL (ref 8.5–10.1)
CHLORIDE SERPL-SCNC: 100 MMOL/L (ref 98–107)
CO2 SERPL-SCNC: 29 MMOL/L (ref 21–32)
CREAT SERPL-MCNC: 1.1 MG/DL (ref 0.55–1.3)
ERYTHROCYTE [DISTWIDTH] IN BLOOD BY AUTOMATED COUNT: 15.5 % (ref 11.6–14.8)
HCT VFR BLD CALC: 34 % (ref 42–52)
HGB BLD-MCNC: 10.4 G/DL (ref 14.2–18)
MCV RBC AUTO: 86 FL (ref 80–99)
PLATELET # BLD: 259 K/UL (ref 150–450)
POTASSIUM SERPL-SCNC: 3.5 MMOL/L (ref 3.5–5.1)
RBC # BLD AUTO: 3.96 M/UL (ref 4.7–6.1)
SODIUM SERPL-SCNC: 137 MMOL/L (ref 136–145)
WBC # BLD AUTO: 2.7 K/UL (ref 4.8–10.8)

## 2020-08-31 RX ADMIN — CLONIDINE HYDROCHLORIDE SCH MG: 0.2 TABLET ORAL at 08:37

## 2020-08-31 RX ADMIN — CLONIDINE HYDROCHLORIDE SCH MG: 0.2 TABLET ORAL at 14:09

## 2020-08-31 RX ADMIN — INSULIN ASPART SCH UNITS: 100 INJECTION, SOLUTION INTRAVENOUS; SUBCUTANEOUS at 12:24

## 2020-08-31 RX ADMIN — MAGNESIUM OXIDE TAB 400 MG (241.3 MG ELEMENTAL MG) SCH MG: 400 (241.3 MG) TAB at 14:08

## 2020-08-31 RX ADMIN — HYDROMORPHONE HYDROCHLORIDE PRN MG: 2 TABLET ORAL at 00:03

## 2020-08-31 RX ADMIN — CARVEDILOL SCH MG: 25 TABLET, FILM COATED ORAL at 08:07

## 2020-08-31 RX ADMIN — HYDROMORPHONE HYDROCHLORIDE PRN MG: 2 TABLET ORAL at 13:05

## 2020-08-31 RX ADMIN — OXYCODONE HYDROCHLORIDE PRN MG: 5 TABLET ORAL at 01:28

## 2020-08-31 RX ADMIN — OXYCODONE HYDROCHLORIDE PRN MG: 5 TABLET ORAL at 08:08

## 2020-08-31 RX ADMIN — HYDROMORPHONE HYDROCHLORIDE PRN MG: 2 TABLET ORAL at 05:24

## 2020-08-31 RX ADMIN — HYDROMORPHONE HYDROCHLORIDE PRN MG: 2 TABLET ORAL at 09:32

## 2020-08-31 RX ADMIN — LORAZEPAM PRN MG: 0.5 TABLET ORAL at 06:36

## 2020-08-31 RX ADMIN — INSULIN ASPART SCH UNITS: 100 INJECTION, SOLUTION INTRAVENOUS; SUBCUTANEOUS at 06:37

## 2020-08-31 RX ADMIN — MAGNESIUM OXIDE TAB 400 MG (241.3 MG ELEMENTAL MG) SCH MG: 400 (241.3 MG) TAB at 08:06

## 2020-08-31 RX ADMIN — LORAZEPAM PRN MG: 0.5 TABLET ORAL at 12:57

## 2020-08-31 RX ADMIN — LORAZEPAM PRN MG: 0.5 TABLET ORAL at 00:03

## 2020-08-31 NOTE — NUR
NURSE HAND-OFF REPORT: 



Important Events on Shift: Pain Management

Patient Status: Stable

Diet: Cardiac



Pending Orders: labs, cbc, bmp

Pending Results/Labs:OB stool, c diff stool, labs this am

Pending MD notification:NA



Latest Vital Signs: Temperature 97.7 , Pulse 75 , B/P 155/97 Respiratory Rate 20 , O2 SAT 
100 , Room Air,   

Vital Sign Comment: hypertensive, refused BP med at midnight and said he only takes that 
once a day not twice



EKG Rhythm: Sinus Rhythm

Rhythm change?: N 





Latest [place] Fall Score: 20  

Fall Risk: Low Risk 

Safety Measures: Call light Within Reach, Bed Alarm Zone 1, Side Rails Side Rails x2, Bed 
position Low and Locked.

Fall Precautions: 

Patient Fall Education



Report given to Felton SMITH

## 2020-08-31 NOTE — NUR
CASE MANAGEMENT: REVIEW





SI: VOMITING AND DIARRHEA . CHEST PAIN . ABD PAIN 

T 99.0  RR 16 /100 SAT 98% ROOM AIR

H/H 11.9/39.1 GLUCOSE 290 

TOX SCREEN: +BENZODIAZEPINES 







IS: DILAUDID IV X1

NS IVF BOLUS X1

FENTANYL IV X1

ZOFRAN IV X1 







***TELEMETRY UNIT STATUS***

DCP: PATIENT IS FROM HOME

## 2020-08-31 NOTE — NUR
NURSE NOTES:

Left message with Rubia at Dr. Rishabh Sweet's office notifying that patient wants to be 
discharged, is it ok for clearance for discharge from cardiac perspective.  Patient is sinus 
rhythm, blood pressure=186/111, pulse=98, provided 60 mg. Nifedipine XL plus am blood 
pressure medications.

## 2020-08-31 NOTE — PSYCH CONSULT PROGRESS NOTE
Psychiatry Progress Note


Psychiatry Progress Note


Allergies:  


Coded Allergies:  


     HYDRALAZINE (Verified  Allergy, Severe, angio edema, 4/28/18)


 angio edema


     ETODOLAC (Unverified  Allergy, Mild, 4/28/18)


     AMLODIPINE (Unverified  Allergy, Unknown, 4/28/18)


 tolerates nifedipine 8/3/16


     ASPIRIN (Verified  Allergy, Unknown, 4/28/18)


     ATENOLOL (Verified  Allergy, Unknown, 4/28/18)


 tolerates metoprolol 8/3/16


     DIPHENHYDRAMINE (Verified  Allergy, Unknown, 4/28/18)


     HYDROCHLOROTHIAZIDE (Verified  Allergy, Unknown, 4/28/18)


     LABETALOL (Verified  Allergy, Unknown, 4/28/18)


 tolerates metoprolol 8/3/16


     LISINOPRIL (Unverified  Allergy, Unknown, 4/26/16)


     LORATADINE (Verified  Allergy, Unknown, 4/28/18)


     MILK (Verified  Allergy, Unknown, 4/28/18)


     MORPHINE (Verified  Allergy, Unknown, 4/28/18)


     NICARDIPINE (Verified  Allergy, Unknown, 4/28/18)


 tolerates nifedipine 8/3/16


     NITROGLYCERIN (Unverified  Allergy, Unknown, 4/28/18)


     PENICILLINS (Unverified  Allergy, Unknown, 4/28/18)


 tolerates cephalexin 8/3/16


     [place] Nut (Verified  Allergy, Unknown, 4/28/18)


     SHELLFISH DERIVED (Verified  Allergy, Unknown, 4/28/18)





Objective Data


Height (Feet):  6


Height (Inches):  2.00


Weight (Pounds):  235


General Appearance:  alert





Assessment/Plan


Status:  unchanged











Kirsten Aguilar MD Aug 31, 2020 23:28

## 2020-08-31 NOTE — NUR
***INSURANCE***





CLINICALS/REVIEW FAXED TO 



[facility]

P:

F:[phone] 



CALL REF#[id number]

## 2020-08-31 NOTE — GENERAL PROGRESS NOTE
Assessment/Plan


Assessment/Plan:


(1) Lumbar DDD


(2) Lumbar Spondylosis


(3) Lumbar Herniated disc


(4) Lumbar Radiculopathy


Patient to be continued on Dilaudid and Oxycodone 


as needed. 


D/w Dr. Fabian and he concurred.





Subjective


Date patient seen:  Aug 31, 2020


Time patient seen:  08:20 - am


Allergies:  


Coded Allergies:  


     HYDRALAZINE (Verified  Allergy, Severe, angio edema, 4/28/18)


 angio edema


     ETODOLAC (Unverified  Allergy, Mild, 4/28/18)


     AMLODIPINE (Unverified  Allergy, Unknown, 4/28/18)


 tolerates nifedipine 8/3/16


     ASPIRIN (Verified  Allergy, Unknown, 4/28/18)


     ATENOLOL (Verified  Allergy, Unknown, 4/28/18)


 tolerates metoprolol 8/3/16


     DIPHENHYDRAMINE (Verified  Allergy, Unknown, 4/28/18)


     HYDROCHLOROTHIAZIDE (Verified  Allergy, Unknown, 4/28/18)


     LABETALOL (Verified  Allergy, Unknown, 4/28/18)


 tolerates metoprolol 8/3/16


     LISINOPRIL (Unverified  Allergy, Unknown, 4/26/16)


     LORATADINE (Verified  Allergy, Unknown, 4/28/18)


     MILK (Verified  Allergy, Unknown, 4/28/18)


     MORPHINE (Verified  Allergy, Unknown, 4/28/18)


     NICARDIPINE (Verified  Allergy, Unknown, 4/28/18)


 tolerates nifedipine 8/3/16


     NITROGLYCERIN (Unverified  Allergy, Unknown, 4/28/18)


     PENICILLINS (Unverified  Allergy, Unknown, 4/28/18)


 tolerates cephalexin 8/3/16


     [place] Nut (Verified  Allergy, Unknown, 4/28/18)


     SHELLFISH DERIVED (Verified  Allergy, Unknown, 4/28/18)


Subjective


REVIEW OF SYSTEMS:  Denies rash, fever, chills, sweating, dizziness,


drowsiness, blurred vision, sore throat, or change in weight.  No


shortness of breath or chest pain.  No nausea, vomiting, diarrhea, or


blood in the stool or urine.  No dysuria.





SUBJECTIVE: In bed no signs of pain or distress. Tolerated on the 


Oxycodone and Dilaudid as needed. No new complaints at this time.





Objective





Last 24 Hour Vital Signs








  Date Time  Temp Pulse Resp B/P (MAP) Pulse Ox O2 Delivery O2 Flow Rate FiO2


 


8/31/20 08:07  84  173/115    


 


8/31/20 08:00 99.5 97 18 173/115 (134) 99   


 


8/31/20 07:06  84 25 158/98 96   


 


8/31/20 06:36  84 25 158/98 96   


 


8/31/20 05:54 97.7       


 


8/31/20 04:24 97.7 84 25 158/98 (118) 96   


 


8/31/20 04:00  86      


 


8/31/20 00:19 97.7 89 19 165/102 (123) 96   


 


8/31/20 00:03  92 18 148/90 96   


 


8/31/20 00:00  92  148/90    


 


8/31/20 00:00  86      


 


8/30/20 21:00      Room Air  


 


8/30/20 20:33  94  150/101    


 


8/30/20 20:00  99      


 


8/30/20 20:00 97.9 94 19 142/94 (110) 95   


 


8/30/20 18:07  84 20 147/89 98   


 


8/30/20 17:04    147/89    


 


8/30/20 16:00 97.9 84 20 147/89 (108) 98   


 


8/30/20 15:33  87      


 


8/30/20 12:29  85      


 


8/30/20 12:00 98.1 80 20 130/86 (101) 95   


 


8/30/20 12:00  80 19 130/85 97   


 


8/30/20 11:56  80  130/85    


 


8/30/20 10:00    150/91 (110)    


 


8/30/20 09:00      Room Air  


 


8/30/20 08:41  93  180/107    


 


8/30/20 08:41    180/107    

















Intake and Output  


 


 8/30/20 8/31/20





 19:00 07:00


 


Intake Total 400 ml 4000 ml


 


Balance 400 ml 4000 ml


 


  


 


Intake Oral 400 ml 4000 ml


 


# Voids 4 5








Laboratory Tests


8/30/20 11:51: POC Whole Blood Glucose 280H


8/30/20 15:58: POC Whole Blood Glucose 272H


8/31/20 05:15: 


White Blood Count 2.7L, Red Blood Count 3.96L, Hemoglobin 10.4L, Hematocrit 

34.0L, Mean Corpuscular Volume 86, Mean Corpuscular Hemoglobin 26.2L, Mean 

Corpuscular Hemoglobin Concent 30.5L, Red Cell Distribution Width 15.5H, 

Platelet Count 259, Mean Platelet Volume 8.4, Neutrophils (%) (Auto) , 

Lymphocytes (%) (Auto) , Monocytes (%) (Auto) , Eosinophils (%) (Auto) , 

Basophils (%) (Auto) , Neutrophils % (Manual) [Pending], Lymphocytes % (Manual) 

[Pending], Platelet Estimate [Pending], Platelet Morphology [Pending], Sodium 

Level 137, Potassium Level 3.5, Chloride Level 100, Carbon Dioxide Level 29, 

Anion Gap 8, Blood Urea Nitrogen 19H, Creatinine 1.1, Estimat Glomerular 

Filtration Rate > 60, Glucose Level 374H, Calcium Level 9.2


8/31/20 05:26: POC Whole Blood Glucose 350H


Height (Feet):  6


Height (Inches):  2.00


Weight (Pounds):  235


Objective


GENERAL:  Alert, awake, oriented.


LUNGS:  Decreased breath sounds bilaterally.


HEART:  S1 and S2 regular.


ABDOMEN:  Soft, nontender.


EXTREMITIES:  No cyanosis.  No clubbing.


NEURO: No changes.











Chong Cintron Aug 31, 2020 08:30

## 2020-08-31 NOTE — NUR
P.T Note:

P.T evaluation completed. Pt is baseline independent in all areas of ADL/functional 
mobilities and gait/locomotion. Current functional status does not warrant skilled P.T 
service at this time. [facility] P[facility]T services. Thank you for this referral.
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
## 2020-08-31 NOTE — NUR
NURSE NOTES:

Follow up with Dr Sweet's request regarding getting 2d echo results and DC summary, pt 
claims that EF at [facility] in [place] was 15%, EF on 2decho here showed 35-40%, pt said at 
[facility] was offered an ICD

## 2020-08-31 NOTE — NUR
NURSE NOTES:

Left message with Arabella at message service for Dr. Madi Torres reporting white blood 
cells=2.7 and patient desires to be discharged.  Will place neutropenic precautions.

## 2020-08-31 NOTE — NUR
NURSE NOTES:

Pt refused Procardia and I wasted it in the pyxis. I educated pt regarding his very elevated 
BP currently at 165/102, but he continued to refuse and stated "I only take that at noon, 
once a day, that's it. I already told them that earlier."

## 2020-08-31 NOTE — GENERAL PROGRESS NOTE
Assessment/Plan


Problem List:  


(1) PTSD (post-traumatic stress disorder)


ICD Codes:  F43.10 - Post-traumatic stress disorder, unspecified


SNOMED:  70876363


(2) Chronic pain disorder


ICD Codes:  G89.4 - Chronic pain syndrome


SNOMED:  434388310


(3) Hypertensive urgency


ICD Codes:  I16.0 - Hypertensive urgency


SNOMED:  637557485


(4) Abdominal pain


ICD Codes:  R10.9 - Unspecified abdominal pain


SNOMED:  15749742


Qualifiers:  


   Qualified Codes:  R10.12 - Left upper quadrant pain


(5) Leukopenia


ICD Codes:  D72.819 - Decreased white blood cell count, unspecified


SNOMED:  55606925, 249490472


Status:  unchanged


Assessment/Plan:


pain bp control pt diet heme sx f/u cbc bmp am





Subjective


Constitutional:  Reports: weakness


Allergies:  


Coded Allergies:  


     HYDRALAZINE (Verified  Allergy, Severe, angio edema, 4/28/18)


 angio edema


     ETODOLAC (Unverified  Allergy, Mild, 4/28/18)


     AMLODIPINE (Unverified  Allergy, Unknown, 4/28/18)


 tolerates nifedipine 8/3/16


     ASPIRIN (Verified  Allergy, Unknown, 4/28/18)


     ATENOLOL (Verified  Allergy, Unknown, 4/28/18)


 tolerates metoprolol 8/3/16


     DIPHENHYDRAMINE (Verified  Allergy, Unknown, 4/28/18)


     HYDROCHLOROTHIAZIDE (Verified  Allergy, Unknown, 4/28/18)


     LABETALOL (Verified  Allergy, Unknown, 4/28/18)


 tolerates metoprolol 8/3/16


     LISINOPRIL (Unverified  Allergy, Unknown, 4/26/16)


     LORATADINE (Verified  Allergy, Unknown, 4/28/18)


     MILK (Verified  Allergy, Unknown, 4/28/18)


     MORPHINE (Verified  Allergy, Unknown, 4/28/18)


     NICARDIPINE (Verified  Allergy, Unknown, 4/28/18)


 tolerates nifedipine 8/3/16


     NITROGLYCERIN (Unverified  Allergy, Unknown, 4/28/18)


     PENICILLINS (Unverified  Allergy, Unknown, 4/28/18)


 tolerates cephalexin 8/3/16


     [place] Nut (Verified  Allergy, Unknown, 4/28/18)


     SHELLFISH DERIVED (Verified  Allergy, Unknown, 4/28/18)


All Systems:  reviewed and negative except above


Subjective


sleepy calm





Objective





Last 24 Hour Vital Signs








  Date Time  Temp Pulse Resp B/P (MAP) Pulse Ox O2 Delivery O2 Flow Rate FiO2


 


8/31/20 08:37    173/115    


 


8/31/20 08:07  84  173/115    


 


8/31/20 08:00 99.5 97 18 173/115 (134) 99   


 


8/31/20 07:06  84 25 158/98 96   


 


8/31/20 06:36  84 25 158/98 96   


 


8/31/20 05:54 97.7       


 


8/31/20 04:24 97.7 84 25 158/98 (118) 96   


 


8/31/20 04:00  86      


 


8/31/20 00:19 97.7 89 19 165/102 (123) 96   


 


8/31/20 00:03  92 18 148/90 96   


 


8/31/20 00:00  92  148/90    


 


8/31/20 00:00  86      


 


8/30/20 21:00      Room Air  


 


8/30/20 20:33  94  150/101    


 


8/30/20 20:00  99      


 


8/30/20 20:00 97.9 94 19 142/94 (110) 95   


 


8/30/20 18:07  84 20 147/89 98   


 


8/30/20 17:04    147/89    


 


8/30/20 16:00 97.9 84 20 147/89 (108) 98   


 


8/30/20 15:33  87      


 


8/30/20 12:29  85      


 


8/30/20 12:00 98.1 80 20 130/86 (101) 95   


 


8/30/20 12:00  80 19 130/85 97   


 


8/30/20 11:56  80  130/85    


 


8/30/20 10:00    150/91 (110)    

















Intake and Output  


 


 8/30/20 8/31/20





 19:00 07:00


 


Intake Total 400 ml 4000 ml


 


Balance 400 ml 4000 ml


 


  


 


Intake Oral 400 ml 4000 ml


 


# Voids 4 5








Laboratory Tests


8/30/20 11:51: POC Whole Blood Glucose 280H


8/30/20 15:58: POC Whole Blood Glucose 272H


8/31/20 05:15: 


White Blood Count 2.7L, Red Blood Count 3.96L, Hemoglobin 10.4L, Hematocrit 

34.0L, Mean Corpuscular Volume 86, Mean Corpuscular Hemoglobin 26.2L, Mean 

Corpuscular Hemoglobin Concent 30.5L, Red Cell Distribution Width 15.5H, 

Platelet Count 259, Mean Platelet Volume 8.4, Neutrophils (%) (Auto) , 

Lymphocytes (%) (Auto) , Monocytes (%) (Auto) , Eosinophils (%) (Auto) , 

Basophils (%) (Auto) , Neutrophils % (Manual) [Pending], Lymphocytes % (Manual) 

[Pending], Platelet Estimate [Pending], Platelet Morphology [Pending], Sodium 

Level 137, Potassium Level 3.5, Chloride Level 100, Carbon Dioxide Level 29, 

Anion Gap 8, Blood Urea Nitrogen 19H, Creatinine 1.1, Estimat Glomerular 

Filtration Rate > 60, Glucose Level 374H, Calcium Level 9.2


8/31/20 05:26: POC Whole Blood Glucose 350H


Height (Feet):  6


Height (Inches):  2.00


Weight (Pounds):  235


General Appearance:  alert


EENT:  normal ENT inspection


Neck:  normal alignment


Cardiovascular:  normal peripheral pulses, normal rate, regular rhythm


Respiratory/Chest:  chest wall non-tender, lungs clear, normal breath sounds


Abdomen:  normal bowel sounds, non tender, soft


Extremities:  normal inspection


Edema:  no edema noted Arm (L), no edema noted Arm (R), no edema noted Leg (L), 

no edema noted Leg (R), no edema noted Pedal (L), no edema noted Pedal (R), no 

edema noted Generalized


Neurologic:  motor weakness


Skin:  normal pigmentation, warm/dry











Madi Torres DO Aug 31, 2020 09:12

## 2020-08-31 NOTE — NUR
NURSE NOTES:

Patient discharged with belongings, blood pressure 191/117 treated with prn Clonidine prior 
to discharge.  Patient insisting on discharge today due to death in family and stated, "I 
have a train to catch at 2 pm to [place]."

## 2020-08-31 NOTE — GENERAL PROGRESS NOTE
Assessment/Plan


Status:  unchanged


Assessment/Plan:


iron def anemia


diarrhea





patient wants to go home


he was given my card to call and to make an appointment


patient will need EGD and colonoscopy





Subjective


ROS Limited/Unobtainable:  Yes


Allergies:  


Coded Allergies:  


     HYDRALAZINE (Verified  Allergy, Severe, angio edema, 4/28/18)


 angio edema


     ETODOLAC (Unverified  Allergy, Mild, 4/28/18)


     AMLODIPINE (Unverified  Allergy, Unknown, 4/28/18)


 tolerates nifedipine 8/3/16


     ASPIRIN (Verified  Allergy, Unknown, 4/28/18)


     ATENOLOL (Verified  Allergy, Unknown, 4/28/18)


 tolerates metoprolol 8/3/16


     DIPHENHYDRAMINE (Verified  Allergy, Unknown, 4/28/18)


     HYDROCHLOROTHIAZIDE (Verified  Allergy, Unknown, 4/28/18)


     LABETALOL (Verified  Allergy, Unknown, 4/28/18)


 tolerates metoprolol 8/3/16


     LISINOPRIL (Unverified  Allergy, Unknown, 4/26/16)


     LORATADINE (Verified  Allergy, Unknown, 4/28/18)


     MILK (Verified  Allergy, Unknown, 4/28/18)


     MORPHINE (Verified  Allergy, Unknown, 4/28/18)


     NICARDIPINE (Verified  Allergy, Unknown, 4/28/18)


 tolerates nifedipine 8/3/16


     NITROGLYCERIN (Unverified  Allergy, Unknown, 4/28/18)


     PENICILLINS (Unverified  Allergy, Unknown, 4/28/18)


 tolerates cephalexin 8/3/16


     [place] Nut (Verified  Allergy, Unknown, 4/28/18)


     SHELLFISH DERIVED (Verified  Allergy, Unknown, 4/28/18)





Objective





Last 24 Hour Vital Signs








  Date Time  Temp Pulse Resp B/P (MAP) Pulse Ox O2 Delivery O2 Flow Rate FiO2


 


8/31/20 10:02 99.5       


 


8/31/20 08:37    173/115    


 


8/31/20 08:07  84  173/115    


 


8/31/20 08:00  108      


 


8/31/20 08:00 99.5 97 18 173/115 (134) 99   


 


8/31/20 07:06  84 25 158/98 96   


 


8/31/20 06:36  84 25 158/98 96   


 


8/31/20 04:24 97.7 84 25 158/98 (118) 96   


 


8/31/20 04:00  86      


 


8/31/20 00:19 97.7 89 19 165/102 (123) 96   


 


8/31/20 00:03  92 18 148/90 96   


 


8/31/20 00:00  92  148/90    


 


8/31/20 00:00  86      


 


8/30/20 21:00      Room Air  


 


8/30/20 20:33  94  150/101    


 


8/30/20 20:00  99      


 


8/30/20 20:00 97.9 94 19 142/94 (110) 95   


 


8/30/20 18:07  84 20 147/89 98   


 


8/30/20 17:04    147/89    


 


8/30/20 16:00 97.9 84 20 147/89 (108) 98   


 


8/30/20 15:33  87      


 


8/30/20 12:29  85      


 


8/30/20 12:00 98.1 80 20 130/86 (101) 95   


 


8/30/20 12:00  80 19 130/85 97   


 


8/30/20 11:56  80  130/85    

















Intake and Output  


 


 8/30/20 8/31/20





 19:00 07:00


 


Intake Total 400 ml 4000 ml


 


Balance 400 ml 4000 ml


 


  


 


Intake Oral 400 ml 4000 ml


 


# Voids 4 5








Laboratory Tests


8/30/20 15:58: POC Whole Blood Glucose 272H


8/31/20 05:15: 


White Blood Count 2.7L, Red Blood Count 3.96L, Hemoglobin 10.4L, Hematocrit 

34.0L, Mean Corpuscular Volume 86, Mean Corpuscular Hemoglobin 26.2L, Mean 

Corpuscular Hemoglobin Concent 30.5L, Red Cell Distribution Width 15.5H, 

Platelet Count 259, Mean Platelet Volume 8.4, Neutrophils (%) (Auto) , 

Lymphocytes (%) (Auto) , Monocytes (%) (Auto) , Eosinophils (%) (Auto) , 

Basophils (%) (Auto) , Differential Total Cells Counted 100, Neutrophils % (

Manual) 50, Lymphocytes % (Manual) 35, Monocytes % (Manual) 10, Eosinophils % (

Manual) 4H, Basophils % (Manual) 1, Band Neutrophils 0, Platelet Estimate 

Adequate, Platelet Morphology Normal, Hypochromasia 1+, Anisocytosis 1+, Sodium 

Level 137, Potassium Level 3.5, Chloride Level 100, Carbon Dioxide Level 29, 

Anion Gap 8, Blood Urea Nitrogen 19H, Creatinine 1.1, Estimat Glomerular 

Filtration Rate > 60, Glucose Level 374H, Calcium Level 9.2


8/31/20 05:26: POC Whole Blood Glucose 350H


Height (Feet):  6


Height (Inches):  2.00


Weight (Pounds):  235


General Appearance:  alert


EENT:  normal ENT inspection


Neck:  supple


Cardiovascular:  normal rate


Respiratory/Chest:  lungs clear


Abdomen:  normal bowel sounds, non tender, soft


Extremities:  non-tender











Phill Fleming MD Aug 31, 2020 11:57

## 2020-08-31 NOTE — INITIAL PSYCHIATRIC EVALUATION
Psychiatry Consultation


Psychiatry Consultation


Chief Complaint:  Abdominal Pain


Allergies:  


Coded Allergies:  


     HYDRALAZINE (Verified  Allergy, Severe, angio edema, 4/28/18)


 angio edema


     ETODOLAC (Unverified  Allergy, Mild, 4/28/18)


     AMLODIPINE (Unverified  Allergy, Unknown, 4/28/18)


 tolerates nifedipine 8/3/16


     ASPIRIN (Verified  Allergy, Unknown, 4/28/18)


     ATENOLOL (Verified  Allergy, Unknown, 4/28/18)


 tolerates metoprolol 8/3/16


     DIPHENHYDRAMINE (Verified  Allergy, Unknown, 4/28/18)


     HYDROCHLOROTHIAZIDE (Verified  Allergy, Unknown, 4/28/18)


     LABETALOL (Verified  Allergy, Unknown, 4/28/18)


 tolerates metoprolol 8/3/16


     LISINOPRIL (Unverified  Allergy, Unknown, 4/26/16)


     LORATADINE (Verified  Allergy, Unknown, 4/28/18)


     MILK (Verified  Allergy, Unknown, 4/28/18)


     MORPHINE (Verified  Allergy, Unknown, 4/28/18)


     NICARDIPINE (Verified  Allergy, Unknown, 4/28/18)


 tolerates nifedipine 8/3/16


     NITROGLYCERIN (Unverified  Allergy, Unknown, 4/28/18)


     PENICILLINS (Unverified  Allergy, Unknown, 4/28/18)


 tolerates cephalexin 8/3/16


     [place] Nut (Verified  Allergy, Unknown, 4/28/18)


     SHELLFISH DERIVED (Verified  Allergy, Unknown, 4/28/18)





Medication History


Scheduled


Citalopram Hydrobromide* (Celexa*), 20 MG ORAL DAILY


Clonidine HCl (Clonidine HCl), 0.3 MG PO THREE TIMES A DAY, (Reported)


Clorazepate Dipotassium (Tranxene T-Tab), 7.5 MG PO TWICE A DAY, (Reported)


Esomeprazole Magnesium (Nexium), 40 MG ORAL DAILY, (Reported)


Hydromorphone HCl (Dilaudid), 2 MG ORAL Q4H


Insulin Glargine (Lantus), 35 SUBQ BEDTIME, (Reported)


Nifedipine Er* (Adalat Cc*), 60 MG ORAL TWICE A DAY, (Reported)





Scheduled PRN


Diazepam* (Valium*), 10 MG ORAL TID PRN for ANXIETY, (Reported)


Esomeprazole Magnesium (Nexium), 20 MG ORAL DAILY PRN for AD, (Reported)


OXYCODONE HCl* (Roxicodone*), 15 MG ORAL Q6H PRN for For Pain, (Reported)


Oxycodone Hcl/Acetaminophen  Mg Tablet (Percocet  Mg Tablet*), 1 

TAB ORAL Q8H PRN for For Pain, (Reported)





Miscellaneous Medications


Insulin Lispro (Humalog), 0 SUBQ, (Reported)





Discontinued Medications


Citalopram Hydrobromide* (Celexa*), 20 MG ORAL DAILY, (Reported)


   Discontinued Reason: MD discontinued med


Hydrocodone Bit/Acetaminophen * (Norco *), 1 TAB ORAL Q4H PRN for 

For Pain, (Reported)


   Discontinued Reason: Medication dose changed


Losartan Potassium (Cozaar), 100 MG ORAL DAILY, (Reported)


   Discontinued Reason: MD discontinued med


Metoprolol Tartrate* (Metoprolol Tartrate*), 50 MG ORAL BID, (Reported)


   Discontinued Reason: MD discontinued med





Objective Data


Height (Feet):  6


Height (Inches):  2.00


Weight (Pounds):  235











Kirsten Aguilar MD Aug 31, 2020 23:27

## 2020-08-31 NOTE — CARDIAC ELECTROPHYSIOLOGY PN
Assessment/Plan


Assessment/Plan


1. Accelerated hypertension.  On Coreg  25 mg b.i.d.  Procardia 60 mg b.i.d. 

and clonidine 0.2 mg b.i.d.and p.r.n. clonidine  


     Offered to change Procardia and Clonidine to Lasix 40 po bid and Diovan 80 

bid in view of his CMP but refusing.


       Wants to stay on his home meds until he gets to [place].





2. CHF EF 35%. Says EF was 15% at [facility] 3 months ago and was offered ICD 


     Refusing IV access. On Coreg. allergic to Lisinopril and Hydralazine. 


     Added Lasix 40 po bid and Diovan 80 bid but refusing 





3. Chest pain.  Troponins are negative.   EKG shows LVH and repolarization 

abnormality Echo EF 35%





4. History of pulmonary embolus.  The patient underwent a CT of the


chest, abdomen, and pelvis that showed no acute findings, and a CT of


abdomen and pelvis was also normal.





5. History of noncompliance.


6. History of depression and anxiety.





EPI RN





Subjective


Subjective


  ECho EF 35%-40%. Refusing to adjust cardiac meds. Very noncompliant and 

argumentative with RN and staff. Wants to leave and get to train to [place]. BP 

in 180s





Objective





Last 24 Hour Vital Signs








  Date Time  Temp Pulse Resp B/P (MAP) Pulse Ox O2 Delivery O2 Flow Rate FiO2


 


8/31/20 12:57  98 20 186/111 98   


 


8/31/20 12:12  98  186/111    


 


8/31/20 12:00 99.0 98 20 186/111 (136) 98   


 


8/31/20 10:02 99.5       


 


8/31/20 08:37    173/115    


 


8/31/20 08:07  84  173/115    


 


8/31/20 08:00  108      


 


8/31/20 08:00 99.5 97 18 173/115 (134) 99   


 


8/31/20 07:06  84 25 158/98 96   


 


8/31/20 06:36  84 25 158/98 96   


 


8/31/20 04:24 97.7 84 25 158/98 (118) 96   


 


8/31/20 04:00  86      


 


8/31/20 00:19 97.7 89 19 165/102 (123) 96   


 


8/31/20 00:03  92 18 148/90 96   


 


8/31/20 00:00  92  148/90    


 


8/31/20 00:00  86      


 


8/30/20 21:00      Room Air  


 


8/30/20 20:33  94  150/101    


 


8/30/20 20:00  99      


 


8/30/20 20:00 97.9 94 19 142/94 (110) 95   


 


8/30/20 18:07  84 20 147/89 98   


 


8/30/20 17:04    147/89    


 


8/30/20 16:00 97.9 84 20 147/89 (108) 98   


 


8/30/20 15:33  87      

















Intake and Output  


 


 8/30/20 8/31/20





 19:00 07:00


 


Intake Total 400 ml 4000 ml


 


Balance 400 ml 4000 ml


 


  


 


Intake Oral 400 ml 4000 ml


 


# Voids 4 5











Laboratory Tests








Test


  8/30/20


15:58 8/31/20


05:15 8/31/20


05:26 8/31/20


12:11


 


POC Whole Blood Glucose


  272 MG/DL


()  H 


  350 MG/DL


()  H 317 MG/DL


()  H


 


White Blood Count


  


  2.7 K/UL


(4.8-10.8)  L 


  


 


 


Red Blood Count


  


  3.96 M/UL


(4.70-6.10)  L 


  


 


 


Hemoglobin


  


  10.4 G/DL


(14.2-18.0)  L 


  


 


 


Hematocrit


  


  34.0 %


(42.0-52.0)  L 


  


 


 


Mean Corpuscular Volume  86 FL (80-99)    


 


Mean Corpuscular Hemoglobin


  


  26.2 PG


(27.0-31.0)  L 


  


 


 


Mean Corpuscular Hemoglobin


Concent 


  30.5 G/DL


(32.0-36.0)  L 


  


 


 


Red Cell Distribution Width


  


  15.5 %


(11.6-14.8)  H 


  


 


 


Platelet Count


  


  259 K/UL


(150-450) 


  


 


 


Mean Platelet Volume


  


  8.4 FL


(6.5-10.1) 


  


 


 


Neutrophils (%) (Auto)


  


  % (45.0-75.0)


  


  


 


 


Lymphocytes (%) (Auto)


  


  % (20.0-45.0)


  


  


 


 


Monocytes (%) (Auto)   % (1.0-10.0)    


 


Eosinophils (%) (Auto)   % (0.0-3.0)    


 


Basophils (%) (Auto)   % (0.0-2.0)    


 


Differential Total Cells


Counted 


  100  


  


  


 


 


Neutrophils % (Manual)  50 % (45-75)    


 


Lymphocytes % (Manual)  35 % (20-45)    


 


Monocytes % (Manual)  10 % (1-10)    


 


Eosinophils % (Manual)  4 % (0-3)  H  


 


Basophils % (Manual)  1 % (0-2)    


 


Band Neutrophils  0 % (0-8)    


 


Platelet Estimate  Adequate    


 


Platelet Morphology  Normal    


 


Hypochromasia  1+    


 


Anisocytosis  1+    


 


Sodium Level


  


  137 MMOL/L


(136-145) 


  


 


 


Potassium Level


  


  3.5 MMOL/L


(3.5-5.1) 


  


 


 


Chloride Level


  


  100 MMOL/L


() 


  


 


 


Carbon Dioxide Level


  


  29 MMOL/L


(21-32) 


  


 


 


Anion Gap


  


  8 mmol/L


(5-15) 


  


 


 


Blood Urea Nitrogen


  


  19 mg/dL


(7-18)  H 


  


 


 


Creatinine


  


  1.1 MG/DL


(0.55-1.30) 


  


 


 


Estimat Glomerular Filtration


Rate 


  > 60 mL/min


(>60) 


  


 


 


Glucose Level


  


  374 MG/DL


()  H 


  


 


 


Calcium Level


  


  9.2 MG/DL


(8.5-10.1) 


  


 











Microbiology








 Date/Time


Source Procedure


Growth Status


 


 


 8/29/20 07:35


Nasopharynx SARS-CoV-2 RdRp Gene Assay - Final Complete








Objective





HEAD AND NECK:  No JVD.


LUNGS:  Clear.


CARDIOVASCULAR:  Shows regular S1 and S2 with no gallop or murmur.


 


ABDOMEN:  Soft.


EXTREMITIES:  No pitting edema.











Rishabh Sweet MD Aug 31, 2020 13:49

## 2020-08-31 NOTE — NUR
NURSE NOTES:

Patient pulled off monitor, got dressed, went to medical records.  I spoke with Sharath at 
message service of Dr. Madi Torres with message that patient wants to be discharged.  
Patient also has provided a list of medications that he would like called into his pharmacy, 
([facility]).

## 2020-09-01 NOTE — DISCHARGE SUMMARY
Discharge Summary


Discharge Summary


_


DATE OF ADMISSION: 8/29/2020





DATE OF DISCHARGE: 8/31/2020








DISCHARGED BY: 





REASON FOR ADMISSION: 


45 years old male with past medical history of hypertension, spinal stenosis, 

diabetes mellitus, presented to emergency department with vomiting , diarrhea 

and generalized abdominal pain.  


Patient also reported   exertional  chest pain, radiating to his shoulder.  


Patient reported history of pulmonary emboli x 2 in the last year.  


He stopped anticoagulation in January or February.  





Patient also reported that he had   reduced ejection fraction. 


Nuclear stress test from 2016 in this facility  revealed calculated ejection 

fraction of 53%.  


Patient  allergic to contrast dye  and  usually gets prednisone or Solu-Medrol 

before having CT scan . 


Patient reported eating dinner and several hours later started to experience 

generalized abdominal cramping and pain.  


Patient reported  one  episode of nonbloody nonbilious vomiting and one episode 

of nonbloody diarrhea.  





EKG revealed sinus tachycardia with inverted ST inferiorly.  Troponin was 

negative. 


Rapid COVID-19 was negative. 


Abdominal pain was reported as sharp in nature,  diffuse and poorly localized.  


He denied fever and chills.  


He denied shortness of breath, palpitations.


Upon evaluation vital signs were stable.  


Laboratory work-up revealed no leukocytosis,hemoglobin 11.9 ,hematocrit 39.1 ,

platelet count 327.  


Potassium 3.0.  


BUN 13, creatinine 1.1.  


Glucose 290.  


Lactic acid 1.0.  


Stable LFT.  


Chest x-ray revealed no acute cardiopulmonary pathology.


CT scan of the abdomen and pelvis revealed no evidence of intra-abdominal 

pathology.  


In emergency department patient received analgesic, antiemetic,1 L of fluid.  


Potassium was replaced.  


Patient subsequently admitted to telemetry floor for further management.  


 





CONSULTANTS:


cardiologist Dr. Webb


GI specialist Dr. Allen


nephrologist Dr. Galvez


surgery Dr. Blanco


pain specialist Dr. Fabian


psychiatrist 


 


[facility] COURSE: 





Patient admitted to telemetry floor.  


Patient noted to have  accelerated  blood pressure.  





Blood pressure was managed with   multiply antihypertensive medications, 

including beta-blocker, calcium channel blocker  and clonidine.  Additional 

clonidine  was on board  as needed for blood pressure spikes.  


Cardiologist recommended diuretics , however patient declined.  


Patient wants to stay on his home medication until he gets to [place].  


Echocardiogram revealed ejection fraction of 35%.  


Patient reported that the ejection fraction was 15% 3 months ago , and patient 

was offered ICD, that he declined.


Patient   allergic to lisinopril and hydralazine .


Patient was on beta-blocker a


Cardiologist added diuretic and Diovan,  but patient refused.  


Troponin were negative.  


EKG revealed left ventricular hypertrophy and repolarization abnormality.


Supplemental oxygen was on board as needed to keep pulse oximetry above 92% .


Pulse oximetry remained stable on room air.








Anemia work up revealed evidence of iron deficiency anemia .


Hemoglobin and  hematocrit were closely monitored with goal to keep hemoglobin 

above 7;  prior to discharge hemoglobin 10.4 , hematocrit 34.  


Patient  declined EGD and colonoscopy.


Patient received information  how to  contact  GI  to schedule   outpatient GI 

procedures. 


Patient slowly started on diet .


Patient was able to tolerate diet


Patient did not provide stool sample as was asked. 





Pain management was addressed as per pain specialist recommendation.


Renal parameters  and electrolytes were closely monitored .


Nephrotoxic's were avoided.  Electrolytes corrected as needed.


'


Surgeon seen and evaluated patient . Abdominal exam was benign.


CT scan of the abdomen  and pelvis was benign.


No acute surgical intervention was  required at this time.





FINAL DIAGNOSES: 


Hypertensive urgency 


Cardiomyopathy with ejection fraction 35%


Chest pain


History of pulmonary emboli


Noncompliance


Depression and anxiety


Iron deficiency anemia


Diarrhea  


Lumbar degenerative disc disease


Lumbar stenosis


Herniated disc





 


DISCHARGE MEDICATIONS:


See Medication Reconciliation list.





DISCHARGE INSTRUCTIONS:.


Per the primary care provider in 1 week.  


Patient to follow-up with GI as outpatient for EGD and colonoscopy


Patient was discharged home





I have been assigned to dictate discharge summary for this account. 


I was not involved in the patient's management.











Mayra Simmons NP Sep 1, 2020 12:59

## 2020-10-30 ENCOUNTER — HOSPITAL ENCOUNTER (EMERGENCY)
Dept: HOSPITAL 87 - ER | Age: 45
Discharge: HOME | End: 2020-10-30
Payer: MEDICAID

## 2020-10-30 VITALS — WEIGHT: 198.42 LBS | HEIGHT: 71 IN | BODY MASS INDEX: 27.78 KG/M2

## 2020-10-30 VITALS — SYSTOLIC BLOOD PRESSURE: 158 MMHG | DIASTOLIC BLOOD PRESSURE: 102 MMHG

## 2020-10-30 DIAGNOSIS — I11.0: Primary | ICD-10-CM

## 2020-10-30 DIAGNOSIS — Z91.14: ICD-10-CM

## 2020-10-30 DIAGNOSIS — Z91.011: ICD-10-CM

## 2020-10-30 DIAGNOSIS — Z91.041: ICD-10-CM

## 2020-10-30 DIAGNOSIS — Z79.899: ICD-10-CM

## 2020-10-30 DIAGNOSIS — E11.9: ICD-10-CM

## 2020-10-30 DIAGNOSIS — Z88.6: ICD-10-CM

## 2020-10-30 DIAGNOSIS — I50.9: ICD-10-CM

## 2020-10-30 DIAGNOSIS — Z79.4: ICD-10-CM

## 2020-10-30 PROCEDURE — 99283 EMERGENCY DEPT VISIT LOW MDM: CPT

## 2020-10-30 PROCEDURE — 93005 ELECTROCARDIOGRAM TRACING: CPT

## 2020-11-20 ENCOUNTER — HOSPITAL ENCOUNTER (EMERGENCY)
Dept: HOSPITAL 87 - ER | Age: 45
LOS: 1 days | Discharge: LEFT BEFORE DISCHARGE | End: 2020-11-21
Payer: COMMERCIAL

## 2020-11-20 VITALS — HEIGHT: 71 IN | BODY MASS INDEX: 27.47 KG/M2 | WEIGHT: 196.21 LBS

## 2020-11-20 VITALS — SYSTOLIC BLOOD PRESSURE: 170 MMHG | DIASTOLIC BLOOD PRESSURE: 103 MMHG

## 2020-11-20 DIAGNOSIS — K62.5: ICD-10-CM

## 2020-11-20 DIAGNOSIS — I11.0: ICD-10-CM

## 2020-11-20 DIAGNOSIS — E78.00: ICD-10-CM

## 2020-11-20 DIAGNOSIS — R10.0: ICD-10-CM

## 2020-11-20 DIAGNOSIS — R07.2: Primary | ICD-10-CM

## 2020-11-20 DIAGNOSIS — I50.9: ICD-10-CM

## 2020-11-20 LAB
AMPHETAMINES UR QL SCN: NEGATIVE
APPEARANCE UR: CLEAR
APTT PPP: 27.3 SEC (ref 23.4–31)
BARBITURATES UR QL SCN: NEGATIVE
BASOPHILS NFR BLD AUTO: 1.2 % (ref 0–2)
BENZODIAZ UR QL SCN: (no result)
BZE UR QL SCN: NEGATIVE
CANNABINOIDS UR QL SCN: NEGATIVE
CHLORIDE SERPL-SCNC: 104 MEQ/L (ref 98–107)
COLOR UR: YELLOW
D DIMER PPP FEU-MCNC: < 0.19 MG/L FEU (ref ?–0.5)
EOSINOPHIL NFR BLD AUTO: 2.5 % (ref 0–5)
ERYTHROCYTE [DISTWIDTH] IN BLOOD BY AUTOMATED COUNT: 17.6 % (ref 11.6–14.6)
ETHANOL SERPL-MCNC: < 10 MG/DL
HCT VFR BLD AUTO: 35.6 % (ref 42–52)
HGB BLD-MCNC: 11.7 G/DL (ref 14–18)
HGB UR QL STRIP: NEGATIVE
INR PPP: 1
KETONES UR STRIP-MCNC: NEGATIVE MG/DL
LEUKOCYTE ESTERASE UR QL STRIP: NEGATIVE
LYMPHOCYTES NFR BLD AUTO: 27.1 % (ref 20–50)
MCH RBC QN AUTO: 27.1 PG (ref 28–32)
MCV RBC AUTO: 82.8 FL (ref 80–94)
METHADONE UR QL SCN: NEGATIVE
MONOCYTES NFR BLD AUTO: 8.5 % (ref 2–8)
NEUTROPHILS NFR BLD AUTO: 60.7 % (ref 40–76)
NITRITE UR QL STRIP: NEGATIVE
OPIATES UR QL SCN: (no result)
PCP UR QL SCN: NEGATIVE
PH UR STRIP: 7.5 [PH] (ref 4.5–8)
PLATELET # BLD AUTO: 241 X1000/UL (ref 130–400)
PMV BLD AUTO: 8.4 FL (ref 7.4–10.4)
PROT UR QL STRIP: (no result)
PROTHROMBIN TIME: 10.4 SEC (ref 9.6–11)
RBC # BLD AUTO: 4.3 MILL/UL (ref 4.7–6.1)
SP GR UR STRIP: 1.03 (ref 1–1.03)
UROBILINOGEN UR STRIP-MCNC: 0.2 E.U./DL (ref 0.2–1)

## 2020-11-20 PROCEDURE — 85730 THROMBOPLASTIN TIME PARTIAL: CPT

## 2020-11-20 PROCEDURE — 85025 COMPLETE CBC W/AUTO DIFF WBC: CPT

## 2020-11-20 PROCEDURE — 80320 DRUG SCREEN QUANTALCOHOLS: CPT

## 2020-11-20 PROCEDURE — 80305 DRUG TEST PRSMV DIR OPT OBS: CPT

## 2020-11-20 PROCEDURE — 36415 COLL VENOUS BLD VENIPUNCTURE: CPT

## 2020-11-20 PROCEDURE — 83880 ASSAY OF NATRIURETIC PEPTIDE: CPT

## 2020-11-20 PROCEDURE — 74176 CT ABD & PELVIS W/O CONTRAST: CPT

## 2020-11-20 PROCEDURE — 87086 URINE CULTURE/COLONY COUNT: CPT

## 2020-11-20 PROCEDURE — 83690 ASSAY OF LIPASE: CPT

## 2020-11-20 PROCEDURE — 85379 FIBRIN DEGRADATION QUANT: CPT

## 2020-11-20 PROCEDURE — G0480 DRUG TEST DEF 1-7 CLASSES: HCPCS

## 2020-11-20 PROCEDURE — 99285 EMERGENCY DEPT VISIT HI MDM: CPT

## 2020-11-20 PROCEDURE — 84484 ASSAY OF TROPONIN QUANT: CPT

## 2020-11-20 PROCEDURE — 81003 URINALYSIS AUTO W/O SCOPE: CPT

## 2020-11-20 PROCEDURE — 85610 PROTHROMBIN TIME: CPT

## 2020-11-20 PROCEDURE — 80053 COMPREHEN METABOLIC PANEL: CPT

## 2020-11-20 PROCEDURE — 71045 X-RAY EXAM CHEST 1 VIEW: CPT

## 2020-11-20 PROCEDURE — 93005 ELECTROCARDIOGRAM TRACING: CPT

## 2021-08-14 ENCOUNTER — HOSPITAL ENCOUNTER (EMERGENCY)
Dept: HOSPITAL 87 - ER | Age: 46
Discharge: HOME | End: 2021-08-14
Payer: COMMERCIAL

## 2021-08-14 VITALS — WEIGHT: 180.78 LBS | HEIGHT: 70 IN | BODY MASS INDEX: 25.88 KG/M2

## 2021-08-14 VITALS — SYSTOLIC BLOOD PRESSURE: 131 MMHG | DIASTOLIC BLOOD PRESSURE: 62 MMHG

## 2021-08-14 DIAGNOSIS — D72.819: ICD-10-CM

## 2021-08-14 DIAGNOSIS — Z91.041: ICD-10-CM

## 2021-08-14 DIAGNOSIS — R10.84: Primary | ICD-10-CM

## 2021-08-14 DIAGNOSIS — Z20.822: ICD-10-CM

## 2021-08-14 DIAGNOSIS — Z91.011: ICD-10-CM

## 2021-08-14 DIAGNOSIS — Z88.8: ICD-10-CM

## 2021-08-14 LAB
BASOPHILS NFR BLD MANUAL: 2 % (ref 0–2)
CHLORIDE SERPL-SCNC: 107 MEQ/L (ref 98–107)
EOSINOPHIL NFR BLD MANUAL: 1 % (ref 0–5)
ERYTHROCYTE [DISTWIDTH] IN BLOOD BY AUTOMATED COUNT: 20.6 % (ref 11.6–14.6)
ETHANOL SERPL-MCNC: < 10 MG/DL
HCT VFR BLD AUTO: 32.4 % (ref 42–52)
HGB BLD-MCNC: 10.1 G/DL (ref 14–18)
LYMPHOCYTES NFR BLD MANUAL: 40 % (ref 20–50)
MCH RBC QN AUTO: 25.4 PG (ref 28–32)
MCV RBC AUTO: 81.6 FL (ref 80–94)
MONOCYTES NFR BLD MANUAL: 10 % (ref 2–8)
NEUTS SEG NFR BLD MANUAL: 47 % (ref 45–75)
PLATELET # BLD AUTO: 310 X1000/UL (ref 130–400)
PLATELET # BLD EST: NORMAL 10*3/UL
PMV BLD AUTO: 8.8 FL (ref 7.4–10.4)
RBC # BLD AUTO: 3.97 MILL/UL (ref 4.7–6.1)

## 2021-08-14 PROCEDURE — 80053 COMPREHEN METABOLIC PANEL: CPT

## 2021-08-14 PROCEDURE — 80320 DRUG SCREEN QUANTALCOHOLS: CPT

## 2021-08-14 PROCEDURE — 85025 COMPLETE CBC W/AUTO DIFF WBC: CPT

## 2021-08-14 PROCEDURE — 36415 COLL VENOUS BLD VENIPUNCTURE: CPT

## 2021-08-14 PROCEDURE — G0480 DRUG TEST DEF 1-7 CLASSES: HCPCS

## 2021-08-14 PROCEDURE — 99283 EMERGENCY DEPT VISIT LOW MDM: CPT

## 2021-08-14 PROCEDURE — 87426 SARSCOV CORONAVIRUS AG IA: CPT
